# Patient Record
Sex: MALE | Race: BLACK OR AFRICAN AMERICAN | Employment: FULL TIME | URBAN - METROPOLITAN AREA
[De-identification: names, ages, dates, MRNs, and addresses within clinical notes are randomized per-mention and may not be internally consistent; named-entity substitution may affect disease eponyms.]

---

## 2017-04-12 RX ORDER — LIRAGLUTIDE 6 MG/ML
INJECTION SUBCUTANEOUS
Qty: 90 EACH | Refills: 10 | Status: SHIPPED | OUTPATIENT
Start: 2017-04-12 | End: 2018-03-18 | Stop reason: SDUPTHER

## 2017-06-02 ENCOUNTER — OFFICE VISIT (OUTPATIENT)
Dept: INTERNAL MEDICINE CLINIC | Age: 57
End: 2017-06-02

## 2017-06-02 VITALS
RESPIRATION RATE: 18 BRPM | TEMPERATURE: 97.7 F | HEART RATE: 86 BPM | SYSTOLIC BLOOD PRESSURE: 131 MMHG | WEIGHT: 260.9 LBS | DIASTOLIC BLOOD PRESSURE: 78 MMHG | OXYGEN SATURATION: 97 % | HEIGHT: 71 IN | BODY MASS INDEX: 36.52 KG/M2

## 2017-06-02 DIAGNOSIS — E66.09 NON MORBID OBESITY DUE TO EXCESS CALORIES: ICD-10-CM

## 2017-06-02 DIAGNOSIS — I10 ESSENTIAL HYPERTENSION: ICD-10-CM

## 2017-06-02 DIAGNOSIS — E11.9 TYPE 2 DIABETES MELLITUS WITHOUT COMPLICATION, WITH LONG-TERM CURRENT USE OF INSULIN (HCC): Primary | ICD-10-CM

## 2017-06-02 DIAGNOSIS — Z00.00 PREVENTATIVE HEALTH CARE: ICD-10-CM

## 2017-06-02 DIAGNOSIS — E78.5 DYSLIPIDEMIA: ICD-10-CM

## 2017-06-02 DIAGNOSIS — Z79.4 TYPE 2 DIABETES MELLITUS WITHOUT COMPLICATION, WITH LONG-TERM CURRENT USE OF INSULIN (HCC): Primary | ICD-10-CM

## 2017-06-02 NOTE — PROGRESS NOTES
SPORTS MEDICINE AND PRIMARY CARE  Christen Saleh MD, [address] [address]  Phone:  [phone]  Fax: [phone]       Chief Complaint   Patient presents with    Well Male   . SUBJECTIVE:    Ale Moore is a 59 y.o. male Patient returns today ambulatory, alert and appropriate and has the capacity to give an accurate history. He has a known history of diabetes, dyslipidemia, primary hypertension, and is seen for evaluation. Current Outpatient Prescriptions   Medication Sig Dispense Refill    insulin lispro protamine/insulin lispro (HUMALOG MIX 75-25) 100 unit/mL (75-25) injection by SubCUTAneous route. Patient taking 50 units in the morning and 30 in units at night.  VICTOZA 3-KATERINE 0.6 mg/0.1 mL (18 mg/3 mL) sub-q pen DIAL AND INJECT SUBCUTANEOUSLY 1.8MG DAILY 90 Each 10    simvastatin (ZOCOR) 20 mg tablet TAKE ONE TABLET BY MOUTH DAILY 90 Tab 7    LANTUS 100 unit/mL injection INJECT 74 UNITS UNDER THE SKIN BEFORE SUPPER (Patient taking differently: INJECT 70 UNITS UNDER THE SKIN BEFORE SUPPER) 60 Vial 11    valsartan-hydrochlorothiazide (DIOVAN-HCT) 320-25 mg per tablet TAKE ONE TABLET BY MOUTH DAILY 30 Tab 11    metFORMIN ER (GLUCOPHAGE XR) 500 mg tablet TAKE ONE TABLET BY MOUTH TWICE A DAY 60 Tab 11    glucose blood VI test strips (ONETOUCH ULTRA TEST) strip USE TO TEST BLOOD SUGAR FOUR TIMES A  Strip 11    Insulin Needles, Disposable, (ESTEPHANIA PEN NEEDLE) 32 gauge x 5/32\" ndle USE TO INJECT INSULIN EVERY NIGHT 100 Pen Needle 11    Insulin Syringe-Needle U-100 (BD INSULIN SYRINGE ULTRA-FINE) 1 mL 31 x 5/16\" syrg TID ac 100 Syringe 11    mesalamine EC (ASACOL) 400 mg EC tablet Take  by mouth three (3) times daily.        Past Medical History:   Diagnosis Date    [facility]     Dyslipidemia     Hypertension     Obesity     Preventative health care 06/02/2017    S/P colonoscopy 2016    Ulcerative colitis ([facility] 1-15-15    md melodie + Past Surgical History:   Procedure Laterality Date    HX COLONOSCOPY       No Known Allergies      REVIEW OF SYSTEMS:  General: negative for - chills or fever  ENT: negative for - headaches, nasal congestion or tinnitus  Respiratory: negative for - cough, hemoptysis, shortness of breath or wheezing  Cardiovascular : negative for - chest pain, edema, palpitations or shortness of breath  Gastrointestinal: negative for - abdominal pain, blood in stools, heartburn or nausea/vomiting  Genito-Urinary: no dysuria, trouble voiding, or hematuria  Musculoskeletal: negative for - gait disturbance, joint pain, joint stiffness or joint swelling  Neurological: no TIA or stroke symptoms  Hematologic: no bruises, no bleeding, no swollen glands  Integument: no lumps, mole changes, nail changes or rash  Endocrine: no malaise/lethargy or unexpected weight changes      Social History     Social History    Marital status:      Spouse name: N/A    Number of children: N/A    Years of education: N/A     Social History Main Topics    Smoking status: Never Smoker    Smokeless tobacco: Never Used    Alcohol use 0.6 oz/week     1 Glasses of wine per week    Drug use: None    Sexual activity: Not Asked     Other Topics Concern    None     Social History Narrative    Medical History: Diabetes mellitus type 2Primary hypertensionDyslipidemiaObesityher a 17  chest CT negative for tumor    Surgical History: colonoscopy and polypectomy 2009    Hospitalization/Major Diagnostic Procedure: No Hospitalization History. Family History: Mother:  54 yrs transitional cell carcinoma hypertension diabetes Father:  68 yrs Stage renal disease    hypertension diabetes CLL TIA Sister(s): alive Brother(s): alive Diabetes, hypertension polyps Daughter(s): alive 25 yrs w /m fall Son(s): alive 24    yrs Senior at [employer] in economics - hedgefund in [place]  2 brother(s) , 1 sister(s) .  1 son(s) , 1 daughter(s) . education [employer]    Social History: Alcohol Use Patient uses alcohol, Drinks per occasion: 1, Drinks per w [employer] [place]: 1. Smoking Status Patient is a current some day    smoker, Received cessation counseling on: 07/03/2013. Marital Status: . Lives w ith: spouse. 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     Family History   Problem Relation Age of Onset    Cancer Mother        OBJECTIVE:    Visit Vitals    /78 (BP 1 Location: Left arm, BP Patient Position: Sitting)    Pulse 86    Temp 97.7 °F (36.5 °C) (Oral)    Resp 18    Ht 5' 11\" (1.803 m)    Wt 260 lb 14.4 oz (118.3 kg)    SpO2 97%    BMI 36.39 kg/m2     CONSTITUTIONAL: well , well nourished, appears age appropriate  EYES: perrla, eom intact  ENMT:moist mucous membranes, pharynx clear  NECK: supple. Thyroid normal  RESPIRATORY: Chest: clear bilaterally   CARDIOVASCULAR: Heart: regular rate and rhythm  GASTROINTESTINAL: Abdomen: soft, bowel sounds active  HEMATOLOGIC: no pathological lymph nodes palpated  MUSCULOSKELETAL: Extremities: no edema, pulse 1+   INTEGUMENT: No unusual rashes or suspicious skin lesions noted. Nails appear normal.  NEUROLOGIC: non-focal exam   MENTAL STATUS: alert and oriented, appropriate affect           ASSESSMENT:  1. Type 2 diabetes mellitus without complication, with long-term current use of insulin ([address]    2. Dyslipidemia    3. Preventative health care    4. Essential hypertension    5. Non morbid obesity due to excess calories      Patient's medical status is generally stable. We discuss his physical activity for which he is doing on a regular basis as well as a weight reduction program.      Blood pressure control is adequate. We will assess his metabolic status as well as status of his blood sugar with a hemoglobin A1C. EKG will be checked. He will return to the office in one year.   He will have a hemoglobin A1C every six months if it is less than 7, every four months if it is less than 7.5 and every three months if it is greater than 8. PLAN:  .  Orders Placed This Encounter    URINALYSIS W/ RFLX MICROSCOPIC    CBC WITH AUTOMATED DIFF    METABOLIC PANEL, COMPREHENSIVE    LIPID PANEL    PROSTATE SPECIFIC AG    HEMOGLOBIN A1C WITH EAG    MICROALBUMIN, UR, RAND W/ MICROALBUMIN/CREA RATIO    AMB POC EKG ROUTINE W/ 12 LEADS, INTER & REP    insulin lispro protamine/insulin lispro (HUMALOG MIX 75-25) 100 unit/mL (75-25) injection       Follow-up Disposition:  Return in about 1 year (around 6/2/2018). ATTENTION:   This medical record was transcribed using an electronic medical records system. Although proofread, it may and can contain electronic and spelling errors. Other human spelling and other errors may be present. Corrections may be executed at a later time. Please feel free to contact us for any clarifications as needed.

## 2017-06-02 NOTE — MR AVS SNAPSHOT
Visit Information Date & Time Provider Department Dept. Phone Encounter #  
 6/2/2017  9:00 AM Edie Cowan [facility] Sports Medicine and Primary Care [phone] [id number] Follow-up Instructions Return in about 1 year (around 6/2/2018). Follow-up and Disposition History Upcoming Health Maintenance Date Due FOBT Q 1 YEAR AGE 50-75 7/1/2016 INFLUENZA AGE 9 TO ADULT 8/1/2017 DTaP/Tdap/Td series (2 - Td) 6/2/2027 Allergies as of 6/2/2017  Review Complete On: 6/2/2017 By: Edie Cowan MD  
 No Known Allergies Current Immunizations  Never Reviewed No immunizations on file. Not reviewed this visit You Were Diagnosed With   
  
 Codes Comments Type 2 diabetes mellitus without complication, with long-term current use of insulin (HCC)    -  Primary ICD-10-CM: E11.9, Z79.4 ICD-9-CM: 250.00, V58.67 Dyslipidemia     ICD-10-CM: E78.5 ICD-9-CM: 272.4 Preventative health care     ICD-10-CM: Z00.00 ICD-9-CM: V70.0 Essential hypertension     ICD-10-CM: I10 
ICD-9-CM: 401.9 Non morbid obesity due to excess calories     ICD-10-CM: E66.09 
ICD-9-CM: 278.00 Vitals BP Pulse Temp Resp Height(growth percentile) Weight(growth percentile) 131/78 (BP 1 Location: Left arm, BP Patient Position: Sitting) 86 97.7 °F (36.5 °C) (Oral) 18 5' 11\" (1.803 m) 260 lb 14.4 oz (118.3 kg) SpO2 BMI Smoking Status 97% 36.39 kg/m2 Never Smoker BMI and BSA Data Body Mass Index Body Surface Area  
 36.39 kg/m 2 2.43 m 2 Preferred Pharmacy Pharmacy Name Phone [facility] [phone] Your Updated Medication List  
  
   
This list is accurate as of: 6/2/17  9:41 AM.  Always use your most recent med list.  
  
  
  
  
 ASACOL 400 mg EC tablet Generic drug:  mesalamine EC Take  by mouth three (3) times daily. glucose blood VI test strips strip Commonly known as:  ONETOUCH ULTRA TEST  
USE TO TEST BLOOD SUGAR FOUR TIMES A DAY HumaLOG Mix 75-25 100 unit/mL (75-25) injection Generic drug:  insulin lispro protamine/insulin lispro  
by SubCUTAneous route. Patient taking 50 units in the morning and 30 in units at night. Insulin Needles (Disposable) 32 gauge x 5/32\" Ndle Commonly known as:  Sharmin Pen Needle USE TO INJECT INSULIN EVERY NIGHT Insulin Syringe-Needle U-100 1 mL 31 gauge x 5/16 Syrg Commonly known as:  BD INSULIN SYRINGE ULTRA-FINE  
TID ac  
  
 LANTUS 100 unit/mL injection Generic drug:  insulin glargine INJECT 74 UNITS UNDER THE SKIN BEFORE SUPPER  
  
 metFORMIN  mg tablet Commonly known as:  GLUCOPHAGE XR  
TAKE ONE TABLET BY MOUTH TWICE A DAY  
  
 simvastatin 20 mg tablet Commonly known as:  ZOCOR  
TAKE ONE TABLET BY MOUTH DAILY  
  
 valsartan-hydroCHLOROthiazide 320-25 mg per tablet Commonly known as:  DIOVAN-HCT  
TAKE ONE TABLET BY MOUTH DAILY  
  
 VICTOZA 3-KATERINE 0.6 mg/0.1 mL (18 mg/3 mL) sub-q pen Generic drug:  Liraglutide DIAL AND INJECT SUBCUTANEOUSLY 1.8MG DAILY We Performed the Following AMB POC EKG ROUTINE W/ 12 LEADS, INTER & REP [05650 CPT(R)] CBC WITH AUTOMATED DIFF [70815 CPT(R)] HEMOGLOBIN A1C WITH EAG [20978 CPT(R)] LIPID PANEL [40765 CPT(R)] LIPOPROTEIN (A) [[id number] Custom] METABOLIC PANEL, COMPREHENSIVE [01770 CPT(R)] MICROALBUMIN, UR, RAND W/ MICROALBUMIN/CREA RATIO [id number] CPT(R)] ID COLLECTION VENOUS BLOOD,VENIPUNCTURE [id number] CPT(R)] PROSTATE SPECIFIC AG (PSA) [id number] CPT(R)] URINALYSIS W/ RFLX MICROSCOPIC [05749 CPT(R)] Follow-up Instructions Return in about 1 year (around 6/2/2018). Introducing [facility]!    
 Ronnie Gary introduces [employer] patient portal. Now you can access parts of your medical record, email your doctor's office, and request medication refills online. 1. In your internet browser, go to [email or web address] 2. Click on the First Time User? Click Here link in the Sign In box. You will see the New Member Sign Up page. 3. Enter your [email or web address] Access Code exactly as it appears below. You will not need to use this code after youve completed the sign-up process. If you do not sign up before the expiration date, you must request a new code. · [employer] Access Code: [email or web address] Expires: 8/31/2017  9:41 AM 
 
4. Enter the last four digits of your Social Security Number (xxxx) and Date of Birth (mm/dd/yyyy) as indicated and click Submit. You will be taken to the next sign-up page. 5. Create a [employer] ID. This will be your [employer] login ID and cannot be changed, so think of one that is secure and easy to remember. 6. Create a [employer] password. You can change your password at any time. 7. Enter your Password Reset Question and Answer. This can be used at a later time if you forget your password. 8. Enter your e-mail address. You will receive e-mail notification when new information is available in [address] 9. Click Sign Up. You can now view and download portions of your medical record. 10. Click the Download Summary menu link to download a portable copy of your medical information. If you have questions, please visit the Frequently Asked Questions section of the [email or web address] website. Remember, [employer] is NOT to be used for urgent needs. For medical emergencies, dial 911. Now available from your iPhone and Android! Please provide this summary of care documentation to your next provider. If you have any questions after today's visit, please call [phone].

## 2017-06-02 NOTE — PROGRESS NOTES
Chief Complaint   Patient presents with    Well Male   1. Have you been to the ER, urgent care clinic since your last visit? Hospitalized since your last visit? No    2. Have you seen or consulted any other health care providers outside of the [address] [place] [address] since your last visit? Include any pap smears or colon screening.  No

## 2017-07-13 LAB
ALBUMIN SERPL-MCNC: 4.2 G/DL (ref 3.5–5.5)
ALBUMIN/CREAT UR: 31.5 MG/G CREAT (ref 0–30)
ALBUMIN/GLOB SERPL: 1.6 {RATIO} (ref 1.2–2.2)
ALP SERPL-CCNC: 52 IU/L (ref 39–117)
ALT SERPL-CCNC: 22 IU/L (ref 0–44)
APPEARANCE UR: CLEAR
AST SERPL-CCNC: 16 IU/L (ref 0–40)
BASOPHILS # BLD AUTO: 0 X10E3/UL (ref 0–0.2)
BASOPHILS NFR BLD AUTO: 0 %
BILIRUB SERPL-MCNC: 0.4 MG/DL (ref 0–1.2)
BILIRUB UR QL STRIP: NEGATIVE
BUN SERPL-MCNC: 13 MG/DL (ref 6–24)
BUN/CREAT SERPL: 14 (ref 9–20)
CALCIUM SERPL-MCNC: 9.4 MG/DL (ref 8.7–10.2)
CHLORIDE SERPL-SCNC: 100 MMOL/L (ref 96–106)
CHOLEST SERPL-MCNC: 135 MG/DL (ref 100–199)
CO2 SERPL-SCNC: 23 MMOL/L (ref 18–29)
COLOR UR: YELLOW
CREAT SERPL-MCNC: 0.95 MG/DL (ref 0.76–1.27)
CREAT UR-MCNC: 208.6 MG/DL
EOSINOPHIL # BLD AUTO: 0.1 X10E3/UL (ref 0–0.4)
EOSINOPHIL NFR BLD AUTO: 2 %
ERYTHROCYTE [DISTWIDTH] IN BLOOD BY AUTOMATED COUNT: 14.9 % (ref 12.3–15.4)
EST. AVERAGE GLUCOSE BLD GHB EST-MCNC: 212 MG/DL
GLOBULIN SER CALC-MCNC: 2.6 G/DL (ref 1.5–4.5)
GLUCOSE SERPL-MCNC: 141 MG/DL (ref 65–99)
GLUCOSE UR QL: NEGATIVE
HBA1C MFR BLD: 9 % (ref 4.8–5.6)
HCT VFR BLD AUTO: 43 % (ref 37.5–51)
HDLC SERPL-MCNC: 37 MG/DL
HGB BLD-MCNC: 14.6 G/DL (ref 12.6–17.7)
HGB UR QL STRIP: NEGATIVE
IMM GRANULOCYTES # BLD: 0 X10E3/UL (ref 0–0.1)
IMM GRANULOCYTES NFR BLD: 0 %
KETONES UR QL STRIP: NEGATIVE
LDLC SERPL CALC-MCNC: 81 MG/DL (ref 0–99)
LEUKOCYTE ESTERASE UR QL STRIP: NEGATIVE
LPA SERPL-SCNC: 9 NMOL/L
LYMPHOCYTES # BLD AUTO: 2.3 X10E3/UL (ref 0.7–3.1)
LYMPHOCYTES NFR BLD AUTO: 29 %
MCH RBC QN AUTO: 29 PG (ref 26.6–33)
MCHC RBC AUTO-ENTMCNC: 34 G/DL (ref 31.5–35.7)
MCV RBC AUTO: 85 FL (ref 79–97)
MICRO URNS: NORMAL
MICROALBUMIN UR-MCNC: 65.8 UG/ML
MONOCYTES # BLD AUTO: 0.6 X10E3/UL (ref 0.1–0.9)
MONOCYTES NFR BLD AUTO: 7 %
NEUTROPHILS # BLD AUTO: 4.9 X10E3/UL (ref 1.4–7)
NEUTROPHILS NFR BLD AUTO: 62 %
NITRITE UR QL STRIP: NEGATIVE
PH UR STRIP: 6 [PH] (ref 5–7.5)
PLATELET # BLD AUTO: 273 X10E3/UL (ref 150–379)
POTASSIUM SERPL-SCNC: 4.2 MMOL/L (ref 3.5–5.2)
PROT SERPL-MCNC: 6.8 G/DL (ref 6–8.5)
PROT UR QL STRIP: NORMAL
PSA SERPL-MCNC: 0.8 NG/ML (ref 0–4)
RBC # BLD AUTO: 5.04 X10E6/UL (ref 4.14–5.8)
SODIUM SERPL-SCNC: 142 MMOL/L (ref 134–144)
SP GR UR: 1.03 (ref 1–1.03)
TRIGL SERPL-MCNC: 86 MG/DL (ref 0–149)
UROBILINOGEN UR STRIP-MCNC: 0.2 MG/DL (ref 0.2–1)
VLDLC SERPL CALC-MCNC: 17 MG/DL (ref 5–40)
WBC # BLD AUTO: 8 X10E3/UL (ref 3.4–10.8)

## 2017-07-17 RX ORDER — VALSARTAN AND HYDROCHLOROTHIAZIDE 320; 25 MG/1; MG/1
TABLET, FILM COATED ORAL
Qty: 30 TAB | Refills: 10 | Status: SHIPPED | OUTPATIENT
Start: 2017-07-17 | End: 2018-06-11 | Stop reason: SDUPTHER

## 2017-07-17 RX ORDER — METFORMIN HYDROCHLORIDE 500 MG/1
TABLET, EXTENDED RELEASE ORAL
Qty: 60 TAB | Refills: 10 | Status: SHIPPED | OUTPATIENT
Start: 2017-07-17 | End: 2018-06-11 | Stop reason: SDUPTHER

## 2017-08-07 NOTE — TELEPHONE ENCOUNTER
From: Bhargavi Mallory  To: Michael Aaron MD  Sent: 8/6/2017 2:48 PM EDT  Subject: Medication Renewal Request    Original authorizing provider: MD Bhargavi Nunez would like a refill of the following medications:  Insulin Syringe-Needle U-100 (BD INSULIN SYRINGE ULTRA-FINE) 1 mL 31 x 5/16\" syrg Michael Aaron MD]    Preferred pharmacy: [facility]  [address], [place]:

## 2017-10-31 RX ORDER — INSULIN GLARGINE 100 [IU]/ML
INJECTION, SOLUTION SUBCUTANEOUS
Qty: 60 ML | Refills: 11 | Status: SHIPPED | OUTPATIENT
Start: 2017-10-31 | End: 2018-12-16 | Stop reason: SDUPTHER

## 2018-03-19 RX ORDER — LIRAGLUTIDE 6 MG/ML
INJECTION SUBCUTANEOUS
Qty: 9 ADJUSTABLE DOSE PRE-FILLED PEN SYRINGE | Refills: 11 | Status: SHIPPED | OUTPATIENT
Start: 2018-03-19 | End: 2019-03-11 | Stop reason: SDUPTHER

## 2018-05-16 NOTE — TELEPHONE ENCOUNTER
From: Sharonda Linda  To: Vibha Martinez MD  Sent: 5/15/2018 11:18 AM EDT  Subject: Medication Renewal Request    Original authorizing provider: MD Sharonda Guevara would like a refill of the following medications:  glucose blood VI test strips (ONETOUCH ULTRA TEST) strip Vibha Martinez MD]    Preferred pharmacy: [address]    Comment:  Humalog mix 75/25 Rx # [id number] si units s.q.  AM and 50 units s.q. PM

## 2018-05-18 ENCOUNTER — OFFICE VISIT (OUTPATIENT)
Dept: INTERNAL MEDICINE CLINIC | Age: 58
End: 2018-05-18

## 2018-05-18 VITALS
RESPIRATION RATE: 18 BRPM | HEIGHT: 71 IN | WEIGHT: 267.9 LBS | OXYGEN SATURATION: 94 % | BODY MASS INDEX: 37.51 KG/M2 | TEMPERATURE: 97.5 F | HEART RATE: 81 BPM | SYSTOLIC BLOOD PRESSURE: 126 MMHG | DIASTOLIC BLOOD PRESSURE: 74 MMHG

## 2018-05-18 DIAGNOSIS — I10 ESSENTIAL HYPERTENSION: ICD-10-CM

## 2018-05-18 DIAGNOSIS — E78.5 DYSLIPIDEMIA: ICD-10-CM

## 2018-05-18 DIAGNOSIS — E11.21 TYPE 2 DIABETES WITH NEPHROPATHY (HCC): ICD-10-CM

## 2018-05-18 DIAGNOSIS — Z00.00 PREVENTATIVE HEALTH CARE: Primary | ICD-10-CM

## 2018-05-18 PROBLEM — E66.01 SEVERE OBESITY (BMI 35.0-39.9) WITH COMORBIDITY (HCC): Status: ACTIVE | Noted: 2018-05-18

## 2018-05-18 NOTE — PROGRESS NOTES
1. Have you been to the ER, urgent care clinic since your last visit? Hospitalized since your last visit? No    2. Have you seen or consulted any other health care providers outside of the [address] [place] [address] since your last visit? Include any pap smears or colon screening.  No

## 2018-05-18 NOTE — PROGRESS NOTES
SPORTS MEDICINE AND PRIMARY CARE  Favian Wu MD, [address] [address]  Phone:  [phone]  Fax: [phone]       Chief Complaint   Patient presents with    Hypertension     f/u   . SUBJECTIVE:    Colletta Handsome is a 59 y.o. male Patient returns today with known history of diabetes mellitus type 2, obesity, primary hypertension, and dyslipidemia, and is seen for evaluation. He works out three to four times a week. 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 Patient is seen for evaluation. Current Outpatient Prescriptions   Medication Sig Dispense Refill    insulin lispro protamine/insulin lispro (HUMALOG 50-50 MIX) 100 unit/mL (50-50) flexpen 70 Units by SubCUTAneous route Before breakfast and dinner. 42 Adjustable Dose Pre-filled Pen Syringe 11    glucose blood VI test strips (ONETOUCH ULTRA TEST) strip USE TO TEST BLOOD SUGAR FOUR TIMES A  Strip 11    VICTOZA 3-KATERINE 0.6 mg/0.1 mL (18 mg/3 mL) pnij DIAL AND INJECT SUBCUTANEOUSLY 1.8MG DAILY 9 Adjustable Dose Pre-filled Pen Syringe 11    simvastatin (ZOCOR) 20 mg tablet TAKE ONE TABLET BY MOUTH DAILY 90 Tab 6    LANTUS 100 unit/mL injection INJECT 74 UNITS UNDER THE SKIN BEFORE SUPPER 60 mL 11    Insulin Syringe-Needle U-100 1 mL 31 gauge x 5/16 syrg USE WITH LANTUS INSULIN DAILY 100 Syringe 11    valsartan-hydroCHLOROthiazide (DIOVAN-HCT) 320-25 mg per tablet TAKE ONE TABLET BY MOUTH DAILY 30 Tab 10    metFORMIN ER (GLUCOPHAGE XR) 500 mg tablet TAKE ONE TABLET BY MOUTH TWICE A DAY 60 Tab 10    Insulin Needles, Disposable, (ESTEPHANIA PEN NEEDLE) 32 gauge x 5/32\" ndle USE TO INJECT INSULIN EVERY NIGHT 100 Pen Needle 11    mesalamine EC (ASACOL) 400 mg EC tablet Take  by mouth three (3) times daily.        Past Medical History:   Diagnosis Date    Diabetes ([address]     Dyslipidemia     Hypertension     Obesity     Preventative health care 06/02/2017    [facility]     S/P colonoscopy 2016    Ulcerative colitis ([facility] 1-15-15    md melodie +     Past Surgical History:   Procedure Laterality Date    HX COLONOSCOPY       No Known Allergies      REVIEW OF SYSTEMS:  General: negative for - chills or fever  ENT: negative for - headaches, nasal congestion or tinnitus  Respiratory: negative for - cough, hemoptysis, shortness of breath or wheezing  Cardiovascular : negative for - chest pain, edema, palpitations or shortness of breath  Gastrointestinal: negative for - abdominal pain, blood in stools, heartburn or nausea/vomiting  Genito-Urinary: no dysuria, trouble voiding, or hematuria  Musculoskeletal: negative for - gait disturbance, joint pain, joint stiffness or joint swelling  Neurological: no TIA or stroke symptoms  Hematologic: no bruises, no bleeding, no swollen glands  Integument: no lumps, mole changes, nail changes or rash  Endocrine: no malaise/lethargy or unexpected weight changes      Social History     Social History    Marital status:      Spouse name: N/A    Number of children: N/A    Years of education: N/A     Social History Main Topics    Smoking status: Never Smoker    Smokeless tobacco: Never Used    Alcohol use 0.6 oz/week     1 Glasses of wine per week      Comment: occasional    Drug use: No    Sexual activity: Yes     Partners: Female     Birth control/ protection: None     Other Topics Concern    None     Social History Narrative    Medical History: Diabetes mellitus type 2Primary hypertensionDyslipidemiaObesityher a 17  chest CT negative for tumor    Surgical History: colonoscopy and polypectomy 2009    Hospitalization/Major Diagnostic Procedure: No Hospitalization History. Family History:  Mother:  54 yrs transitional cell carcinoma hypertension diabetes Father:  68 yrs Stage renal disease    hypertension diabetes CLL TIA Sister(s): alive Brother(s): alive Diabetes, hypertension polyps Daughter(s): alive 25 yrs w /m fall Son(s): alive 21    yrs Senior at [employer]  2 brother(s) , 1 sister(s) . 1 son(s) , 1 daughter(s) . education [employer] Ronnie u    Social History: Alcohol Use Patient uses alcohol, Drinks per occasion: 1, Drinks per w [place]: 1. Smoking Status Patient is a current some day    smoker, Received cessation counseling on: 07/03/2013. Marital Status: . Lives w ith: spouse. 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     Family History   Problem Relation Age of Onset    Cancer Mother        OBJECTIVE:    Visit Vitals    /74    Pulse 81    Temp 97.5 °F (36.4 °C) (Oral)    Resp 18    Ht 5' 11\" (1.803 m)    Wt 267 lb 14.4 oz (121.5 kg)    SpO2 94%    BMI 37.36 kg/m2     CONSTITUTIONAL: well , well nourished, appears age appropriate  EYES: perrla, eom intact  ENMT:moist mucous membranes, pharynx clear  NECK: supple. Thyroid normal  RESPIRATORY: Chest: clear bilaterally   CARDIOVASCULAR: Heart: regular rate and rhythm  GASTROINTESTINAL: Abdomen: soft, bowel sounds active  HEMATOLOGIC: no pathological lymph nodes palpated  MUSCULOSKELETAL: Extremities: no edema, pulse 1+   INTEGUMENT: No unusual rashes or suspicious skin lesions noted. Nails appear normal.  NEUROLOGIC: non-focal exam   MENTAL STATUS: alert and oriented, appropriate affect           ASSESSMENT:  1. Preventative health care    2. Type 2 diabetes with nephropathy ([address]    3. Essential hypertension    4. Dyslipidemia      This completes his health care visit. We encouraged physical activity and a heart healthy, weight reducing diet. Will assess blood sugar control with Hgb A1c and also look at his urine for microalbumin.     BP control is at goal.    He'll return to the office in 3-4 months or at least yearly for his physical.        PLAN:  .  Orders Placed This Encounter    URINALYSIS W/ RFLX MICROSCOPIC    CBC WITH AUTOMATED DIFF    METABOLIC PANEL, COMPREHENSIVE    LIPID PANEL    PROSTATE SPECIFIC AG    HEMOGLOBIN A1C WITH EAG    CRP, HIGH SENSITIVITY    MICROALBUMIN, UR, RAND W/ MICROALB/CREAT RATIO    AMB POC EKG ROUTINE W/ 12 LEADS, INTER & REP    insulin lispro protamine/insulin lispro (HUMALOG 50-50 MIX) 100 unit/mL (50-50) flexpen       Follow-up Disposition:  Return in about 4 months (around 9/18/2018). ATTENTION:   This medical record was transcribed using an electronic medical records system. Although proofread, it may and can contain electronic and spelling errors. Other human spelling and other errors may be present. Corrections may be executed at a later time. Please feel free to contact us for any clarifications as needed.

## 2018-05-18 NOTE — MR AVS SNAPSHOT
[address] [place] [address] [facility] [id number] [phone] Patient: Drew Anthony MRN: [id number] GTH:[birth year] Visit Information Date & Time Provider Department Dept. Phone Encounter #  
 5/18/2018  8:30 AM Talat Lomeli [facility] [address] [address] [id number] Follow-up Instructions Return in about 4 months (around 9/18/2018). Follow-up and Disposition History Upcoming Health Maintenance Date Due  
 FOOT EXAM Q1 5/9/1968 EYE EXAM RETINAL OR DILATED Q1 5/9/1968 COLONOSCOPY 5/9/1976 Pneumococcal 19-64 Medium Risk (1 of 1 - PPSV23) 5/9/1977 HEMOGLOBIN A1C Q6M 12/2/2017 ZOSTER VACCINE AGE 60> 3/9/2018 MICROALBUMIN Q1 6/2/2018 LIPID PANEL Q1 6/2/2018 Influenza Age 5 to Adult 8/1/2018 DTaP/Tdap/Td series (2 - Td) 6/2/2027 Allergies as of 5/18/2018  Review Complete On: 5/18/2018 By: Lizbet Campa LPN No Known Allergies Current Immunizations  Never Reviewed No immunizations on file. Not reviewed this visit You Were Diagnosed With   
  
 Codes Comments Preventative health care    -  Primary ICD-10-CM: Z00.00 ICD-9-CM: V70.0 Type 2 diabetes with nephropathy (HCC)     ICD-10-CM: E11.21 
ICD-9-CM: 250.40, 583.81 Essential hypertension     ICD-10-CM: I10 
ICD-9-CM: 401.9 Dyslipidemia     ICD-10-CM: E78.5 ICD-9-CM: 272.4 Vitals BP Pulse Temp Resp Height(growth percentile) Weight(growth percentile) 126/74 81 97.5 °F (36.4 °C) (Oral) 18 5' 11\" (1.803 m) 267 lb 14.4 oz (121.5 kg) SpO2 BMI Smoking Status 94% 37.36 kg/m2 Never Smoker Vitals History BMI and BSA Data Body Mass Index Body Surface Area  
 37.36 kg/m 2 2.47 m 2 Preferred Pharmacy Pharmacy Name Phone [facility] [address] [phone] Your Updated Medication List  
  
   
 This list is accurate as of 18 10:30 AM.  Always use your most recent med list.  
  
  
  
  
 ASACOL 400 mg EC tablet Generic drug:  mesalamine EC Take  by mouth three (3) times daily. glucose blood VI test strips strip Commonly known as:  ONETOUCH ULTRA TEST  
USE TO TEST BLOOD SUGAR FOUR TIMES A DAY  
  
 insulin lispro protamine/insulin lispro 100 unit/mL (50-50) flexpen Commonly known as:  HUMALOG 50-50 MIX  
70 Units by SubCUTAneous route Before breakfast and dinner. Insulin Needles (Disposable) 32 gauge x \" Ndle Commonly known as:  Sharmin Pen Needle USE TO INJECT INSULIN EVERY NIGHT Insulin Syringe-Needle U-100 1 mL 31 gauge x  Syrg  
USE WITH LANTUS INSULIN DAILY  
  
 LANTUS U-100 INSULIN 100 unit/mL injection Generic drug:  insulin glargine INJECT 74 UNITS UNDER THE SKIN BEFORE SUPPER  
  
 metFORMIN  mg tablet Commonly known as:  GLUCOPHAGE XR  
TAKE ONE TABLET BY MOUTH TWICE A DAY  
  
 simvastatin 20 mg tablet Commonly known as:  ZOCOR  
TAKE ONE TABLET BY MOUTH DAILY  
  
 valsartan-hydroCHLOROthiazide 320-25 mg per tablet Commonly known as:  DIOVAN-HCT  
TAKE ONE TABLET BY MOUTH DAILY  
  
 VICTOZA 3-KATERINE 0.6 mg/0.1 mL (18 mg/3 mL) Pnij Generic drug:  Liraglutide DIAL AND INJECT SUBCUTANEOUSLY 1.8MG DAILY Prescriptions Sent to Pharmacy Refills  
 insulin lispro protamine/insulin lispro (HUMALOG 50-50 MIX) 100 unit/mL (50-50) flexpen 11 Si Units by SubCUTAneous route Before breakfast and dinner. Class: Normal  
 Pharmacy: [facility] [address] [place] [address] [address], [place] Kos Ph #: [phone] Route: SubCUTAneous We Performed the Following AMB POC EKG ROUTINE W/ 12 LEADS, INTER & REP [60012 CPT(R)] CBC WITH AUTOMATED DIFF [41756 CPT(R)] COLLECTION VENOUS BLOOD,VENIPUNCTURE [id number] CPT(R)] CRP, HIGH SENSITIVITY [44194 CPT(R)] HEMOGLOBIN A1C WITH EAG [02066 CPT(R)] LIPID PANEL [33362 CPT(R)] METABOLIC PANEL, COMPREHENSIVE [99414 CPT(R)] MICROALBUMIN, UR, RAND W/ MICROALB/CREAT RATIO [id number] CPT(R)] PSA, DIAGNOSTIC (PROSTATE SPECIFIC AG) [id number] CPT(R)] URINALYSIS W/ RFLX MICROSCOPIC [96635 CPT(R)] Follow-up Instructions Return in about 4 months (around 9/18/2018). Introducing [facility]! Dear Cole Kleins: Thank you for requesting a [employer] account. Our records indicate that you already have an active [employer] account. You can access your account anytime at [email or web address]. [employer] Did you know that you can access your hospital and ER discharge instructions at any time in [employer]? You can also review all of your test results from your hospital stay or ER visit. Additional Information If you have questions, please visit the Frequently Asked Questions section of the [employer] website at [email or web address] Remember, [employer] is NOT to be used for urgent needs. For medical emergencies, dial 911. Now available from your iPhone and Android! Please provide this summary of care documentation to your next provider. Your primary care clinician is listed as Tatiana James. If you have any questions after today's visit, please call [phone].

## 2018-05-19 LAB
ALBUMIN SERPL-MCNC: 4.5 G/DL (ref 3.6–4.8)
ALBUMIN/CREAT UR: 52.2 MG/G CREAT (ref 0–30)
ALBUMIN/GLOB SERPL: 1.8 {RATIO} (ref 1.2–2.2)
ALP SERPL-CCNC: 49 IU/L (ref 39–117)
ALT SERPL-CCNC: 29 IU/L (ref 0–44)
APPEARANCE UR: CLEAR
AST SERPL-CCNC: 17 IU/L (ref 0–40)
BASOPHILS # BLD AUTO: 0 X10E3/UL (ref 0–0.2)
BASOPHILS NFR BLD AUTO: 0 %
BILIRUB SERPL-MCNC: 0.4 MG/DL (ref 0–1.2)
BILIRUB UR QL STRIP: NEGATIVE
BUN SERPL-MCNC: 12 MG/DL (ref 8–27)
BUN/CREAT SERPL: 16 (ref 10–24)
CALCIUM SERPL-MCNC: 9.5 MG/DL (ref 8.6–10.2)
CHLORIDE SERPL-SCNC: 102 MMOL/L (ref 96–106)
CHOLEST SERPL-MCNC: 166 MG/DL (ref 100–199)
CO2 SERPL-SCNC: 23 MMOL/L (ref 18–29)
COLOR UR: YELLOW
CREAT SERPL-MCNC: 0.75 MG/DL (ref 0.76–1.27)
CREAT UR-MCNC: 81.1 MG/DL
CRP SERPL HS-MCNC: 2.17 MG/L (ref 0–3)
EOSINOPHIL # BLD AUTO: 0.1 X10E3/UL (ref 0–0.4)
EOSINOPHIL NFR BLD AUTO: 2 %
ERYTHROCYTE [DISTWIDTH] IN BLOOD BY AUTOMATED COUNT: 14.6 % (ref 12.3–15.4)
EST. AVERAGE GLUCOSE BLD GHB EST-MCNC: 217 MG/DL
GFR SERPLBLD CREATININE-BSD FMLA CKD-EPI: 100 ML/MIN/1.73
GFR SERPLBLD CREATININE-BSD FMLA CKD-EPI: 115 ML/MIN/1.73
GLOBULIN SER CALC-MCNC: 2.5 G/DL (ref 1.5–4.5)
GLUCOSE SERPL-MCNC: 81 MG/DL (ref 65–99)
GLUCOSE UR QL: NEGATIVE
HBA1C MFR BLD: 9.2 % (ref 4.8–5.6)
HCT VFR BLD AUTO: 44.8 % (ref 37.5–51)
HDLC SERPL-MCNC: 40 MG/DL
HGB BLD-MCNC: 14.9 G/DL (ref 13–17.7)
HGB UR QL STRIP: NEGATIVE
IMM GRANULOCYTES # BLD: 0 X10E3/UL (ref 0–0.1)
IMM GRANULOCYTES NFR BLD: 0 %
KETONES UR QL STRIP: NEGATIVE
LDLC SERPL CALC-MCNC: 100 MG/DL (ref 0–99)
LEUKOCYTE ESTERASE UR QL STRIP: NEGATIVE
LYMPHOCYTES # BLD AUTO: 2.1 X10E3/UL (ref 0.7–3.1)
LYMPHOCYTES NFR BLD AUTO: 29 %
MCH RBC QN AUTO: 28.9 PG (ref 26.6–33)
MCHC RBC AUTO-ENTMCNC: 33.3 G/DL (ref 31.5–35.7)
MCV RBC AUTO: 87 FL (ref 79–97)
MICRO URNS: NORMAL
MICROALBUMIN UR-MCNC: 42.3 UG/ML
MONOCYTES # BLD AUTO: 0.4 X10E3/UL (ref 0.1–0.9)
MONOCYTES NFR BLD AUTO: 6 %
NEUTROPHILS # BLD AUTO: 4.5 X10E3/UL (ref 1.4–7)
NEUTROPHILS NFR BLD AUTO: 63 %
NITRITE UR QL STRIP: NEGATIVE
PH UR STRIP: 5.5 [PH] (ref 5–7.5)
PLATELET # BLD AUTO: 258 X10E3/UL (ref 150–379)
POTASSIUM SERPL-SCNC: 4 MMOL/L (ref 3.5–5.2)
PROT SERPL-MCNC: 7 G/DL (ref 6–8.5)
PROT UR QL STRIP: NEGATIVE
PSA SERPL-MCNC: 0.8 NG/ML (ref 0–4)
RBC # BLD AUTO: 5.16 X10E6/UL (ref 4.14–5.8)
SODIUM SERPL-SCNC: 138 MMOL/L (ref 134–144)
SP GR UR: 1.02 (ref 1–1.03)
TRIGL SERPL-MCNC: 130 MG/DL (ref 0–149)
UROBILINOGEN UR STRIP-MCNC: 0.2 MG/DL (ref 0.2–1)
VLDLC SERPL CALC-MCNC: 26 MG/DL (ref 5–40)
WBC # BLD AUTO: 7.2 X10E3/UL (ref 3.4–10.8)

## 2018-06-11 RX ORDER — VALSARTAN AND HYDROCHLOROTHIAZIDE 320; 25 MG/1; MG/1
TABLET, FILM COATED ORAL
Qty: 30 TAB | Refills: 9 | Status: SHIPPED | OUTPATIENT
Start: 2018-06-11 | End: 2018-08-28 | Stop reason: SINTOL

## 2018-06-11 RX ORDER — METFORMIN HYDROCHLORIDE 500 MG/1
TABLET, EXTENDED RELEASE ORAL
Qty: 60 TAB | Refills: 9 | Status: SHIPPED | OUTPATIENT
Start: 2018-06-11 | End: 2019-06-10 | Stop reason: SDUPTHER

## 2018-08-28 RX ORDER — OLMESARTAN MEDOXOMIL 40 MG/1
40 TABLET ORAL DAILY
Qty: 30 TAB | Refills: 11 | Status: SHIPPED | OUTPATIENT
Start: 2018-08-28 | End: 2019-04-01 | Stop reason: SDUPTHER

## 2018-08-28 RX ORDER — CALCIUM CARB/VITAMIN D3/VIT K1 500-100-40
TABLET,CHEWABLE ORAL
Qty: 100 SYRINGE | Refills: 11 | Status: SHIPPED | OUTPATIENT
Start: 2018-08-28 | End: 2019-10-16 | Stop reason: SDUPTHER

## 2018-12-16 RX ORDER — INSULIN GLARGINE 100 [IU]/ML
INJECTION, SOLUTION SUBCUTANEOUS
Qty: 60 ML | Refills: 11 | Status: SHIPPED | OUTPATIENT
Start: 2018-12-16 | End: 2020-05-05 | Stop reason: SDUPTHER

## 2019-02-09 RX ORDER — SIMVASTATIN 20 MG/1
TABLET, FILM COATED ORAL
Qty: 90 TAB | Refills: 5 | Status: SHIPPED | OUTPATIENT
Start: 2019-02-09 | End: 2020-03-19 | Stop reason: SDUPTHER

## 2019-03-06 RX ORDER — INSULIN GLARGINE 100 [IU]/ML
INJECTION, SOLUTION SUBCUTANEOUS
Qty: 60 VIAL | Refills: 10 | Status: SHIPPED | OUTPATIENT
Start: 2019-03-06 | End: 2020-05-28

## 2019-03-15 RX ORDER — LIRAGLUTIDE 6 MG/ML
INJECTION SUBCUTANEOUS
Qty: 9 ADJUSTABLE DOSE PRE-FILLED PEN SYRINGE | Refills: 11 | Status: SHIPPED | OUTPATIENT
Start: 2019-03-15 | End: 2020-04-02 | Stop reason: SDUPTHER

## 2019-04-30 ENCOUNTER — OFFICE VISIT (OUTPATIENT)
Dept: INTERNAL MEDICINE CLINIC | Age: 59
End: 2019-04-30

## 2019-04-30 VITALS
HEIGHT: 71 IN | HEART RATE: 77 BPM | OXYGEN SATURATION: 94 % | DIASTOLIC BLOOD PRESSURE: 74 MMHG | RESPIRATION RATE: 18 BRPM | SYSTOLIC BLOOD PRESSURE: 116 MMHG | BODY MASS INDEX: 35.78 KG/M2 | WEIGHT: 255.6 LBS | TEMPERATURE: 97.9 F

## 2019-04-30 DIAGNOSIS — E66.01 SEVERE OBESITY (BMI 35.0-39.9) WITH COMORBIDITY (HCC): ICD-10-CM

## 2019-04-30 DIAGNOSIS — E78.5 DYSLIPIDEMIA: ICD-10-CM

## 2019-04-30 DIAGNOSIS — Z00.00 PREVENTATIVE HEALTH CARE: Primary | ICD-10-CM

## 2019-04-30 DIAGNOSIS — I10 ESSENTIAL HYPERTENSION: ICD-10-CM

## 2019-04-30 DIAGNOSIS — E11.21 TYPE 2 DIABETES WITH NEPHROPATHY (HCC): ICD-10-CM

## 2019-04-30 NOTE — PROGRESS NOTES
1. Have you been to the ER, urgent care clinic since your last visit? Hospitalized since your last visit? No 
 
2. Have you seen or consulted any other health care providers outside of the [address] [place] [address] since your last visit? Include any pap smears or colon screening.  No

## 2019-04-30 NOTE — PROGRESS NOTES
[facility] Sharyn Miller MD, [address] [address] Phone:  [phone]  Fax: [phone] Chief Complaint Patient presents with  Annual Exam  
. SUBJECTIVE: 
  Rizwana Urias is a 59 y.o. male Patient returns today for a known preventive healthcare visit. He has a known history of diabetes, hypertension, obesity and remote history of ulcerative colitis and is seen for evaluation. Denies spontaneous complaints. He has titrated his insulin to approach euglycemic response. He is thinking about getting the shingles shot as well as pneumonia shot. He is exercising on a regular basis. He remains active in his practice. Current Outpatient Medications Medication Sig Dispense Refill  valsartan-hydroCHLOROthiazide (DIOVAN-HCT) 320-25 mg per tablet TAKE ONE TABLET BY MOUTH DAILY 30 Tab 11  VICTOZA 3-KATERINE 0.6 mg/0.1 mL (18 mg/3 mL) pnij DIAL AND INJECT 1.8MG SUBCUTANEOUSLY ONCE DAILY 9 Adjustable Dose Pre-filled Pen Syringe 11  
 LANTUS U-100 INSULIN 100 unit/mL injection INJECT 74 UNITS UNDER THE SKIN DAILY BEFORE SUPPER 60 Vial 10  
 simvastatin (ZOCOR) 20 mg tablet TAKE ONE TABLET BY MOUTH DAILY 90 Tab 5  
 LANTUS U-100 INSULIN 100 unit/mL injection INJECT 74 UNITS UNDER THE SKIN DAILY BEFORE SUPPER 60 mL 11  
 Insulin Syringe-Needle U-100 (BD INSULIN SYRINGE ULTRA-FINE) 1 mL 31 gauge x 5/16 syrg TID ac 100 Syringe 11  
 metFORMIN ER (GLUCOPHAGE XR) 500 mg tablet TAKE ONE TABLET BY MOUTH TWICE A DAY 60 Tab 9  
 glucose blood VI test strips (ONETOUCH ULTRA TEST) strip USE TO TEST BLOOD SUGAR FOUR TIMES A  Strip 11  Insulin Syringe-Needle U-100 1 mL 31 gauge x 5/16 syrg USE WITH LANTUS INSULIN DAILY 100 Syringe 11  Insulin Needles, Disposable, (ESTEPHANIA PEN NEEDLE) 32 gauge x 5/32\" ndle USE TO INJECT INSULIN EVERY NIGHT 100 Pen Needle 11  
 insulin lispro protamine/insulin lispro (HUMALOG 50-50 MIX) 100 unit/mL (50-50) flexpen 70 Units by SubCUTAneous route Before breakfast and dinner. 42 Adjustable Dose Pre-filled Pen Syringe 11  
 mesalamine EC (ASACOL) 400 mg EC tablet Take  by mouth three (3) times daily. Past Medical History:  
Diagnosis Date  Diabetes ([facility]  Dyslipidemia  Hypertension  Obesity  Preventative health care 06/02/2017  [facility] health care  S/P colonoscopy 2016  Ulcerative colitis ([facility] 1-15-15  
 md melodie + Past Surgical History:  
Procedure Laterality Date  HX COLONOSCOPY No Known Allergies REVIEW OF SYSTEMS: 
General: negative for - chills or fever ENT: negative for - headaches, nasal congestion or tinnitus Respiratory: negative for - cough, hemoptysis, shortness of breath or wheezing Cardiovascular : negative for - chest pain, edema, palpitations or shortness of breath Gastrointestinal: negative for - abdominal pain, blood in stools, heartburn or nausea/vomiting Genito-Urinary: no dysuria, trouble voiding, or hematuria Musculoskeletal: negative for - gait disturbance, joint pain, joint stiffness or joint swelling Neurological: no TIA or stroke symptoms Hematologic: no bruises, no bleeding, no swollen glands Integument: no lumps, mole changes, nail changes or rash Endocrine: no malaise/lethargy or unexpected weight changes Social History Socioeconomic History  Marital status:  Spouse name: Not on file  Number of children: Not on file  Years of education: Not on file  Highest education level: Not on file Tobacco Use  Smoking status: Never Smoker  Smokeless tobacco: Never Used Substance and Sexual Activity  Alcohol use: Yes Alcohol/week: 0.6 oz Types: 1 Glasses of wine per week Comment: occasional  
 Drug use: No  
 Sexual activity: Yes  
  Partners: Female Birth control/protection: None Social History Narrative  Medical History: Diabetes mellitus type 2Primary hypertensionDyslipidemiaObesityher a 17  chest CT negative for tumor Surgical History: colonoscopy and polypectomy 2009 Hospitalization/Major Diagnostic Procedure: No Hospitalization History. Family History: Mother:  54 yrs transitional cell carcinoma hypertension diabetes Father:  68 yrs Stage renal disease  
 hypertension diabetes CLL TIA Sister(s): alive Brother(s): alive Diabetes, hypertension polyps Daughter(s): alive 25 yrs w /m fall Son(s): alive 24 Yrs investment banking - [employer] in [place]  2 brother(s) , 1 sister(s) . 1 son(s) , 1 daughter(s) . education [facility] [employer] Social History: Alcohol Use Patient uses alcohol, Drinks per occasion: 1, Drinks per w [place]: 1. Smoking Status Patient is a current some day  
 smoker, Received cessation counseling on: 2013. Marital Status: . Lives w ith: spouse. Occupation/W ork: employed full time  
 nephologist. Education/School: has highschool diploma, has college diploma md.  
 
Family History Problem Relation Age of Onset  Cancer Mother OBJECTIVE: 
 
Visit Vitals /74 Pulse 77 Temp 97.9 °F (36.6 °C) (Oral) Resp 18 Ht 5' 11\" (1.803 m) Wt 255 lb 9.6 oz (115.9 kg) SpO2 94% BMI 35.65 kg/m² CONSTITUTIONAL: well , well nourished, appears age appropriate EYES: perrla, eom intact ENMT:moist mucous membranes, pharynx clear NECK: supple. Thyroid normal 
RESPIRATORY: Chest: clear bilaterally CARDIOVASCULAR: Heart: regular rate and rhythm GASTROINTESTINAL: Abdomen: soft, bowel sounds active HEMATOLOGIC: no pathological lymph nodes palpated MUSCULOSKELETAL: Extremities: no edema, pulse 1+ INTEGUMENT: No unusual rashes or suspicious skin lesions noted. Nails appear normal. 
NEUROLOGIC: non-focal exam  
MENTAL STATUS: alert and oriented, appropriate affect ASSESSMENT: 
1. Preventative health care 2. Type 2 diabetes with nephropathy (HCC) 3. Severe obesity (BMI 35.0-39. 9) with comorbidity ([address] 4. Essential hypertension 5. Dyslipidemia Patient's medical status is stable. We congratulate him on his weight loss. BP control is at goal. 
 
I suspect his blood sugar control will be the same. He sees an optometrist for his eyes on a regular basis. He tells me he dilates them as well as checks for glaucoma. He will be back to see us in a year. We communicate with him periodically about his blood sugars. He can stop by either here or locally for a hemoglobin A1c every three months. I have discussed the diagnosis with the patient and the intended plan as seen in the 
orders above. The patient understands and agees with the plan. The patient has  
received an after visit summary and questions were answered concerning 
future plans Patient labs and/or xrays were reviewed Past records were reviewed. PLAN: 
. Orders Placed This Encounter  HEMOGLOBIN A1C WITH EAG  
 URINALYSIS W/ RFLX MICROSCOPIC  CBC WITH AUTOMATED DIFF  
 METABOLIC PANEL, COMPREHENSIVE  LIPID PANEL  
 PROSTATE SPECIFIC AG  
 RUBEOLA AB, IGG  
 REFERRAL FOR COLONOSCOPY Follow-up and Dispositions · Return in about 1 year (around 4/30/2020), or if symptoms worsen or fail to improve. ATTENTION:  
This medical record was transcribed using an electronic medical records system. Although proofread, it may and can contain electronic and spelling errors. Other human spelling and other errors may be present. Corrections may be executed at a later time. Please feel free to contact us for any clarifications as needed.

## 2019-05-01 LAB
ALBUMIN SERPL-MCNC: 4.4 G/DL (ref 3.6–4.8)
ALBUMIN/GLOB SERPL: 1.6 {RATIO} (ref 1.2–2.2)
ALP SERPL-CCNC: 51 IU/L (ref 39–117)
ALT SERPL-CCNC: 18 IU/L (ref 0–44)
APPEARANCE UR: CLEAR
AST SERPL-CCNC: 19 IU/L (ref 0–40)
BASOPHILS # BLD AUTO: 0 X10E3/UL (ref 0–0.2)
BASOPHILS NFR BLD AUTO: 0 %
BILIRUB SERPL-MCNC: 0.3 MG/DL (ref 0–1.2)
BILIRUB UR QL STRIP: NEGATIVE
BUN SERPL-MCNC: 11 MG/DL (ref 8–27)
BUN/CREAT SERPL: 12 (ref 10–24)
CALCIUM SERPL-MCNC: 9.8 MG/DL (ref 8.6–10.2)
CHLORIDE SERPL-SCNC: 101 MMOL/L (ref 96–106)
CHOLEST SERPL-MCNC: 124 MG/DL (ref 100–199)
CO2 SERPL-SCNC: 25 MMOL/L (ref 20–29)
COLOR UR: YELLOW
CREAT SERPL-MCNC: 0.93 MG/DL (ref 0.76–1.27)
EOSINOPHIL # BLD AUTO: 0.2 X10E3/UL (ref 0–0.4)
EOSINOPHIL NFR BLD AUTO: 2 %
ERYTHROCYTE [DISTWIDTH] IN BLOOD BY AUTOMATED COUNT: 14.5 % (ref 12.3–15.4)
EST. AVERAGE GLUCOSE BLD GHB EST-MCNC: 157 MG/DL
GLOBULIN SER CALC-MCNC: 2.7 G/DL (ref 1.5–4.5)
GLUCOSE SERPL-MCNC: 121 MG/DL (ref 65–99)
GLUCOSE UR QL: NEGATIVE
HBA1C MFR BLD: 7.1 % (ref 4.8–5.6)
HCT VFR BLD AUTO: 44.6 % (ref 37.5–51)
HDLC SERPL-MCNC: 38 MG/DL
HGB BLD-MCNC: 14.9 G/DL (ref 13–17.7)
HGB UR QL STRIP: NEGATIVE
IMM GRANULOCYTES # BLD AUTO: 0 X10E3/UL (ref 0–0.1)
IMM GRANULOCYTES NFR BLD AUTO: 0 %
KETONES UR QL STRIP: NEGATIVE
LDLC SERPL CALC-MCNC: 69 MG/DL (ref 0–99)
LEUKOCYTE ESTERASE UR QL STRIP: NEGATIVE
LYMPHOCYTES # BLD AUTO: 2.3 X10E3/UL (ref 0.7–3.1)
LYMPHOCYTES NFR BLD AUTO: 25 %
MCH RBC QN AUTO: 28.9 PG (ref 26.6–33)
MCHC RBC AUTO-ENTMCNC: 33.4 G/DL (ref 31.5–35.7)
MCV RBC AUTO: 86 FL (ref 79–97)
MEV IGG SER IA-ACNC: >300 AU/ML
MICRO URNS: NORMAL
MONOCYTES # BLD AUTO: 0.6 X10E3/UL (ref 0.1–0.9)
MONOCYTES NFR BLD AUTO: 6 %
NEUTROPHILS # BLD AUTO: 6.2 X10E3/UL (ref 1.4–7)
NEUTROPHILS NFR BLD AUTO: 67 %
NITRITE UR QL STRIP: NEGATIVE
PH UR STRIP: 5.5 [PH] (ref 5–7.5)
PLATELET # BLD AUTO: 290 X10E3/UL (ref 150–379)
POTASSIUM SERPL-SCNC: 4.2 MMOL/L (ref 3.5–5.2)
PROT SERPL-MCNC: 7.1 G/DL (ref 6–8.5)
PROT UR QL STRIP: NEGATIVE
PSA SERPL-MCNC: 1.2 NG/ML (ref 0–4)
RBC # BLD AUTO: 5.16 X10E6/UL (ref 4.14–5.8)
SODIUM SERPL-SCNC: 140 MMOL/L (ref 134–144)
SP GR UR: 1.01 (ref 1–1.03)
TRIGL SERPL-MCNC: 86 MG/DL (ref 0–149)
UROBILINOGEN UR STRIP-MCNC: 0.2 MG/DL (ref 0.2–1)
VLDLC SERPL CALC-MCNC: 17 MG/DL (ref 5–40)
WBC # BLD AUTO: 9.3 X10E3/UL (ref 3.4–10.8)

## 2019-05-20 RX ORDER — INSULIN LISPRO 100 [IU]/ML
INJECTION, SUSPENSION SUBCUTANEOUS
Qty: 5 PEN | Refills: 11 | Status: SHIPPED | OUTPATIENT
Start: 2019-05-20 | End: 2020-03-19 | Stop reason: CLARIF

## 2019-10-16 RX ORDER — CALCIUM CARB/VITAMIN D3/VIT K1 500-100-40
TABLET,CHEWABLE ORAL
Qty: 100 SYRINGE | Refills: 10 | Status: SHIPPED | OUTPATIENT
Start: 2019-10-16 | End: 2020-05-05 | Stop reason: SDUPTHER

## 2020-03-19 RX ORDER — SYRINGE AND NEEDLE,INSULIN,1ML 31GX15/64"
1 SYRINGE, EMPTY DISPOSABLE MISCELLANEOUS 2 TIMES DAILY
Qty: 180 PEN NEEDLE | Refills: 11 | Status: SHIPPED | OUTPATIENT
Start: 2020-03-19 | End: 2021-07-25

## 2020-03-19 RX ORDER — SIMVASTATIN 20 MG/1
TABLET, FILM COATED ORAL
Qty: 90 TAB | Refills: 5 | Status: SHIPPED | OUTPATIENT
Start: 2020-03-19 | End: 2021-03-30

## 2020-04-02 RX ORDER — LIRAGLUTIDE 6 MG/ML
INJECTION SUBCUTANEOUS
Qty: 9 ADJUSTABLE DOSE PRE-FILLED PEN SYRINGE | Refills: 11 | Status: SHIPPED | OUTPATIENT
Start: 2020-04-02 | End: 2021-03-26

## 2020-05-05 ENCOUNTER — VIRTUAL VISIT (OUTPATIENT)
Dept: INTERNAL MEDICINE CLINIC | Age: 60
End: 2020-05-05

## 2020-05-05 DIAGNOSIS — K51.90 ULCERATIVE COLITIS WITHOUT COMPLICATIONS, UNSPECIFIED LOCATION (HCC): ICD-10-CM

## 2020-05-05 DIAGNOSIS — E66.01 SEVERE OBESITY (BMI 35.0-39.9) WITH COMORBIDITY (HCC): ICD-10-CM

## 2020-05-05 DIAGNOSIS — E78.5 DYSLIPIDEMIA: ICD-10-CM

## 2020-05-05 DIAGNOSIS — E11.21 TYPE 2 DIABETES WITH NEPHROPATHY (HCC): ICD-10-CM

## 2020-05-05 DIAGNOSIS — I10 ESSENTIAL HYPERTENSION: Primary | ICD-10-CM

## 2020-05-05 RX ORDER — INSULIN LISPRO 100 [IU]/ML
45 INJECTION, SUSPENSION SUBCUTANEOUS 2 TIMES DAILY
Qty: 27 ADJUSTABLE DOSE PRE-FILLED PEN SYRINGE | Refills: 11 | Status: SHIPPED | OUTPATIENT
Start: 2020-05-05 | End: 2021-06-06

## 2020-05-05 NOTE — ASSESSMENT & PLAN NOTE
This condition is managed by Specialist.  Lab Results   Component Value Date/Time    WBC 9.3 04/30/2019 10:08 AM    HGB 14.9 04/30/2019 10:08 AM    HCT 44.6 04/30/2019 10:08 AM    PLATELET 761 83/14/5514 10:08 AM    Creatinine 0.93 04/30/2019 10:08 AM    BUN 11 04/30/2019 10:08 AM    Potassium 4.2 04/30/2019 10:08 AM

## 2020-05-05 NOTE — ASSESSMENT & PLAN NOTE
Key Antihyperglycemic Medications             insulin lispro protamin-lispro (HumaLOG Mix 75-25 KwikPen) flexpen (Taking) 70 Units by SubCUTAneous route two (2) times a day. insulin lispro protamine/insulin lispro (HUMALOG MIX 75/25) 100 unit/mL (75-25) injection (Taking) 70 Units by SubCUTAneous route Before breakfast and dinner.     liraglutide (Victoza 3-Anjel) 0.6 mg/0.1 mL (18 mg/3 mL) pnij (Taking) DIAL AND INJECT 1.8MG SUBCUTANEOUSLY ONCE DAILY    metFORMIN ER (GLUCOPHAGE XR) 500 mg tablet (Taking) TAKE ONE TABLET BY MOUTH TWICE A DAY    LANTUS U-100 INSULIN 100 unit/mL injection (Taking) INJECT 74 UNITS UNDER THE SKIN DAILY BEFORE SUPPER    LANTUS U-100 INSULIN 100 unit/mL injection (Taking) INJECT 74 UNITS UNDER THE SKIN DAILY BEFORE SUPPER        Other Key Diabetic Medications             valsartan-hydroCHLOROthiazide (DIOVAN-HCT) 320-25 mg per tablet (Taking) TAKE ONE TABLET BY MOUTH DAILY    simvastatin (ZOCOR) 20 mg tablet (Taking) TAKE ONE TABLET BY MOUTH DAILY        Lab Results   Component Value Date/Time    Hemoglobin A1c 7.1 04/30/2019 10:08 AM    Glucose 121 04/30/2019 10:08 AM    Creatinine 0.93 04/30/2019 10:08 AM    Cholesterol, total 124 04/30/2019 10:08 AM    HDL Cholesterol 38 04/30/2019 10:08 AM    LDL, calculated 69 04/30/2019 10:08 AM    Triglyceride 86 04/30/2019 10:08 AM     Diabetic Foot and Eye Exam HM Status   Topic Date Due    Eye Exam  05/09/1968    Diabetic Foot Care  04/30/2020

## 2020-05-05 NOTE — ASSESSMENT & PLAN NOTE
Stable, based on history, physical exam and review of pertinent labs, studies and medications; meds reconciled; continue current treatment plan.   Key Obesity Meds             liraglutide (Victoza 3-Anjel) 0.6 mg/0.1 mL (18 mg/3 mL) pnij (Taking) DIAL AND INJECT 1.8MG SUBCUTANEOUSLY ONCE DAILY    metFORMIN ER (GLUCOPHAGE XR) 500 mg tablet (Taking) TAKE ONE TABLET BY MOUTH TWICE A DAY        Lab Results   Component Value Date/Time    Hemoglobin A1c 7.1 04/30/2019 10:08 AM    Glucose 121 04/30/2019 10:08 AM    Cholesterol, total 124 04/30/2019 10:08 AM    HDL Cholesterol 38 04/30/2019 10:08 AM    LDL, calculated 69 04/30/2019 10:08 AM    Triglyceride 86 04/30/2019 10:08 AM    Sodium 140 04/30/2019 10:08 AM    Potassium 4.2 04/30/2019 10:08 AM    ALT (SGPT) 18 04/30/2019 10:08 AM    AST (SGOT) 19 04/30/2019 10:08 AM

## 2020-05-05 NOTE — PROGRESS NOTES
1. Have you been to the ER, urgent care clinic since your last visit? Hospitalized since your last visit? No    2. Have you seen or consulted any other health care providers outside of the [address] [place] [address] since your last visit? Include any pap smears or colon screening.  No     Wants to s to discuss prior auth

## 2020-05-06 NOTE — PROGRESS NOTES
Neil Coronel is a 62 y.o. male who was seen by synchronous (real-time) audio-video technology on 5/5/2020. Consent: Neil Coronel, who was seen by synchronous (real-time) audio-video technology, and/or his healthcare decision maker, is aware that this patient-initiated, Telehealth encounter on 5/5/2020 is a billable service, with coverage as determined by his insurance carrier. He is aware that he may receive a bill and has provided verbal consent to proceed: Yes. Assessment & Plan:   Diagnoses and all orders for this visit:    1. Essential hypertension he monitors his blood pressure at home on a regular basis. Blood pressure reading today was 138/88. No change in his current medications. 2. Type 2 diabetes with nephropathy ([address] he monitors his blood sugars regularly and titrate his insulin accordingly. Fasting blood sugar was 150 today. Assessment & Plan:    Key Antihyperglycemic Medications             insulin lispro protamin-lispro (HumaLOG Mix 75-25 KwikPen) flexpen (Taking) 70 Units by SubCUTAneous route two (2) times a day. insulin lispro protamine/insulin lispro (HUMALOG MIX 75/25) 100 unit/mL (75-25) injection (Taking) 70 Units by SubCUTAneous route Before breakfast and dinner.     liraglutide (Victoza 3-Anjel) 0.6 mg/0.1 mL (18 mg/3 mL) pnij (Taking) DIAL AND INJECT 1.8MG SUBCUTANEOUSLY ONCE DAILY    metFORMIN ER (GLUCOPHAGE XR) 500 mg tablet (Taking) TAKE ONE TABLET BY MOUTH TWICE A DAY    LANTUS U-100 INSULIN 100 unit/mL injection (Taking) INJECT 74 UNITS UNDER THE SKIN DAILY BEFORE SUPPER    LANTUS U-100 INSULIN 100 unit/mL injection (Taking) INJECT 74 UNITS UNDER THE SKIN DAILY BEFORE SUPPER        Other Key Diabetic Medications             valsartan-hydroCHLOROthiazide (DIOVAN-HCT) 320-25 mg per tablet (Taking) TAKE ONE TABLET BY MOUTH DAILY    simvastatin (ZOCOR) 20 mg tablet (Taking) TAKE ONE TABLET BY MOUTH DAILY        Lab Results   Component Value Date/Time    Hemoglobin A1c 7.1 04/30/2019 10:08 AM    Glucose 121 04/30/2019 10:08 AM    Creatinine 0.93 04/30/2019 10:08 AM    Cholesterol, total 124 04/30/2019 10:08 AM    HDL Cholesterol 38 04/30/2019 10:08 AM    LDL, calculated 69 04/30/2019 10:08 AM    Triglyceride 86 04/30/2019 10:08 AM     Diabetic Foot and Eye Exam HM Status   Topic Date Due    Eye Exam  05/09/1970    Diabetic Foot Care  04/30/2020       Orders:  -     REFERRAL TO OPHTHALMOLOGY    3. Severe obesity (BMI 35.0-39. 9) with comorbidity ([address] this continue to remain a challenge for him. He continues to actively attempt to adhere to a heart healthy diabetic weight reducing diet and exercise program.  Assessment & Plan:  Stable, based on history, physical exam and review of pertinent labs, studies and medications; meds reconciled; continue current treatment plan. Key Obesity Meds             liraglutide (Victoza 3-Anjel) 0.6 mg/0.1 mL (18 mg/3 mL) pnij (Taking) DIAL AND INJECT 1.8MG SUBCUTANEOUSLY ONCE DAILY    metFORMIN ER (GLUCOPHAGE XR) 500 mg tablet (Taking) TAKE ONE TABLET BY MOUTH TWICE A DAY        Lab Results   Component Value Date/Time    Hemoglobin A1c 7.1 04/30/2019 10:08 AM    Glucose 121 04/30/2019 10:08 AM    Cholesterol, total 124 04/30/2019 10:08 AM    HDL Cholesterol 38 04/30/2019 10:08 AM    LDL, calculated 69 04/30/2019 10:08 AM    Triglyceride 86 04/30/2019 10:08 AM    Sodium 140 04/30/2019 10:08 AM    Potassium 4.2 04/30/2019 10:08 AM    ALT (SGPT) 18 04/30/2019 10:08 AM    AST (SGOT) 19 04/30/2019 10:08 AM             4. Ulcerative colitis without complications, unspecified location [facility]) currently is asymptomatic is followed by gastroneurologist Holger Hodges MD.  Assessment & Plan:   This condition is managed by Specialist.  Lab Results   Component Value Date/Time    WBC 9.3 04/30/2019 10:08 AM    HGB 14.9 04/30/2019 10:08 AM    HCT 44.6 04/30/2019 10:08 AM    PLATELET 080 47/05/8848 10:08 AM    Creatinine 0.93 04/30/2019 10:08 AM    BUN 11 04/30/2019 10:08 AM    Potassium 4.2 04/30/2019 10:08 AM         5. Dyslipidemia we will assess his lipid profile and metabolic status today with appropriate orders. Other orders  -     insulin lispro protamin-lispro (HumaLOG Mix 75-25 KwikPen) flexpen; 45 Units by SubCUTAneous route two (2) times a day. He will return to see us in 4 to 6 months. Lab requisition will be sent to [address] [place] [address] [facility].. Subjective:   William Lo is a 62 y.o. male who was seen for Hypertension  Patient is seen today with a virtual, video, telehealth encounter. He has a known history of diabetes mellitus type 2, obesity, primary hypertension, dyslipidemia, and is seen for evaluation. He has no new complaints. He is exercising more frequently now with walks since he can go to his gym. Blood sugar control is been fairly good is seen for evaluation. Prior to Admission medications    Medication Sig Start Date End Date Taking? Authorizing Provider   insulin lispro protamin-lispro (HumaLOG Mix 75-25 KwikPen) flexpen 45 Units by SubCUTAneous route two (2) times a day. 5/5/20  Yes Rufus Alston MD   valsartan-hydroCHLOROthiazide (DIOVAN-HCT) 320-25 mg per tablet TAKE ONE TABLET BY MOUTH DAILY 4/26/20  Yes Rufus Alston MD   liraglutide (Victoza 3-Anjel) 0.6 mg/0.1 mL (18 mg/3 mL) pnij DIAL AND INJECT 1.8MG SUBCUTANEOUSLY ONCE DAILY 4/2/20  Yes Rufus Alston MD   simvastatin (ZOCOR) 20 mg tablet TAKE ONE TABLET BY MOUTH DAILY 3/19/20  Yes Rufus Alston MD   insulin syringe-needle U-100 1 mL 31 gauge x 15/64\" syrg 1 Units by Does Not Apply route two (2) times a day.  3/19/20  Yes Rufus Alston MD   ONETOUCH ULTRA BLUE TEST STRIP strip USE TO TEST FOR BLOOD SUGAR FOUR TIMES A DAY 6/16/19  Yes Rufus Alston MD   metFORMIN ER (GLUCOPHAGE XR) 500 mg tablet TAKE ONE TABLET BY MOUTH TWICE A DAY 6/10/19  Yes Rufus Alston MD   LANTUS U-100 INSULIN 100 unit/mL injection INJECT 74 UNITS UNDER THE SKIN DAILY BEFORE SUPPER 3/6/19  Yes Nancy Cardoso MD   Insulin Syringe-Needle U-100 1 mL 31 gauge x 5/16 syrg USE WITH LANTUS INSULIN DAILY 8/15/17  Yes Nancy Cardoso MD   Insulin Needles, Disposable, (ESTEPHANIA PEN NEEDLE) 32 gauge x 5/32\" ndle USE TO INJECT INSULIN EVERY NIGHT 3/18/16  Yes Aj Thomas MD   mesalamine EC (ASACOL) 400 mg EC tablet Take  by mouth three (3) times daily. Provider, Historical     No Known Allergies    Patient Active Problem List   Diagnosis Code    Dyslipidemia E78.5    Ulcerative colitis ([facility] K51.90    Hypertension I10    S/P colonoscopy Z98.890    Preventative health care Z00.00    [facility] E11.21    Severe obesity (BMI 35.0-39. 9) with comorbidity ([facility] E66.01     Patient Active Problem List    Diagnosis Date Noted    Type 2 diabetes with nephropathy ([facility] 05/18/2018    Severe obesity (BMI 35.0-39. 9) with comorbidity ([facility] 05/18/2018    Preventative health care 06/02/2017    Hypertension     S/P colonoscopy 01/01/2016    Dyslipidemia     [facility] colitis ([facility] 01/15/2015     Current Outpatient Medications   Medication Sig Dispense Refill    insulin lispro protamin-lispro (HumaLOG Mix 75-25 KwikPen) flexpen 45 Units by SubCUTAneous route two (2) times a day. 27 Adjustable Dose Pre-filled Pen Syringe 11    valsartan-hydroCHLOROthiazide (DIOVAN-HCT) 320-25 mg per tablet TAKE ONE TABLET BY MOUTH DAILY 90 Tab 10    liraglutide (Victoza 3-Anjel) 0.6 mg/0.1 mL (18 mg/3 mL) pnij DIAL AND INJECT 1.8MG SUBCUTANEOUSLY ONCE DAILY 9 Adjustable Dose Pre-filled Pen Syringe 11    simvastatin (ZOCOR) 20 mg tablet TAKE ONE TABLET BY MOUTH DAILY 90 Tab 5    insulin syringe-needle U-100 1 mL 31 gauge x 15/64\" syrg 1 Units by Does Not Apply route two (2) times a day.  180 Pen Needle 11    ONETOUCH ULTRA BLUE TEST STRIP strip USE TO TEST FOR BLOOD SUGAR FOUR TIMES A  Strip 10    metFORMIN ER (GLUCOPHAGE XR) 500 mg tablet TAKE ONE TABLET BY MOUTH TWICE A  Tab 8    LANTUS U-100 INSULIN 100 unit/mL injection INJECT 74 UNITS UNDER THE SKIN DAILY BEFORE SUPPER 60 Vial 10    Insulin Syringe-Needle U-100 1 mL 31 gauge x 5/16 syrg USE WITH LANTUS INSULIN DAILY 100 Syringe 11    Insulin Needles, Disposable, (ESTEPHANIA PEN NEEDLE) 32 gauge x 5/32\" ndle USE TO INJECT INSULIN EVERY NIGHT 100 Pen Needle 11    mesalamine EC (ASACOL) 400 mg EC tablet Take  by mouth three (3) times daily. No Known Allergies  Past Medical History:   Diagnosis Date    Diabetes ([facility]     Dyslipidemia     Hypertension     Obesity     [facility] 06/02/2017    [facility]     S/P colonoscopy 2016    Ulcerative colitis ([facility] 1-15-15    md melodie +     Past Surgical History:   Procedure Laterality Date    HX COLONOSCOPY       Family History   Problem Relation Age of Onset    Cancer Mother      Social History     Tobacco Use    Smoking status: Never Smoker    Smokeless tobacco: Never Used   Substance Use Topics    Alcohol use: Yes     Alcohol/week: 1.0 standard drinks     Types: 1 Glasses of wine per week     Comment: occasional       ROS    Objective:   Vital Signs: (As obtained by patient/caregiver at home)  There were no vitals taken for this visit.      [INSTRUCTIONS:  \"[x]\" Indicates a positive item  \"[]\" Indicates a negative item  -- DELETE ALL ITEMS NOT EXAMINED]    Constitutional: [x] Appears well-developed and well-nourished [x] No apparent distress      [] Abnormal -     Mental status: [x] Alert and awake  [x] Oriented to person/place/time [x] Able to follow commands    [] Abnormal -     Eyes:   EOM    [x]  Normal    [] Abnormal -   Sclera  [x]  Normal    [] Abnormal -          Discharge [x]  None visible   [] Abnormal -     HENT: [x] Normocephalic, atraumatic  [] Abnormal -   [x] Mouth/Throat: Mucous membranes are moist    External Ears [x] Normal  [] Abnormal -    Neck: [x] No visualized mass [] Abnormal -     Pulmonary/Chest: [x] Respiratory effort normal   [x] No visualized signs of difficulty breathing or respiratory distress        [] Abnormal -      Musculoskeletal:   [x] Normal gait with no signs of ataxia         [x] Normal range of motion of neck        [] Abnormal -     Neurological:        [x] No Facial Asymmetry (Cranial nerve 7 motor function) (limited exam due to video visit)          [x] No gaze palsy        [] Abnormal -          Skin:        [x] No significant exanthematous lesions or discoloration noted on facial skin         [] Abnormal -            Psychiatric:       [x] Normal Affect [] Abnormal -        [x] No Hallucinations    Other pertinent observable physical exam findings:-        We discussed the expected course, resolution and complications of the diagnosis(es) in detail. Medication risks, benefits, costs, interactions, and alternatives were discussed as indicated. I advised him to contact the office if his condition worsens, changes or fails to improve as anticipated. He expressed understanding with the diagnosis(es) and plan. Jovon Hernandez is a 62 y.o. male who was evaluated by a video visit encounter for concerns as above. Patient identification was verified prior to start of the visit. A caregiver was present when appropriate. Due to this being a TeleHealth encounter ([facility]), evaluation of the following organ systems was limited: Vitals/Constitutional/EENT/Resp/CV/GI//MS/Neuro/Skin/Heme-Lymph-Imm. Pursuant to the emergency declaration under the [facility], [facility] waiver authority and the [employer] Act, this Virtual  Visit was conducted, with patient's (and/or legal guardian's) consent, to reduce the patient's risk of exposure to COVID-19 and provide necessary medical care.      Services were provided through a video synchronous discussion virtually to substitute for in-person clinic visit. Patient and provider were located at their individual homes. Valencia Guerra MD    Please note that portions of this dictation may have been recorded with voice recognition software. Some unanticipated grammatical, syntax, homophones, and other interpretive errors are inadvertently transcribed by the computer software. An attempt at proof reading has been made to minimize errors and omissions. Please disregard these errors. Thank you.

## 2020-06-27 RX ORDER — METFORMIN HYDROCHLORIDE 500 MG/1
TABLET, EXTENDED RELEASE ORAL
Qty: 180 TAB | Refills: 7 | Status: SHIPPED | OUTPATIENT
Start: 2020-06-27 | End: 2021-09-22

## 2020-11-30 DIAGNOSIS — E11.21 TYPE 2 DIABETES WITH NEPHROPATHY (HCC): Primary | ICD-10-CM

## 2020-11-30 DIAGNOSIS — E78.5 DYSLIPIDEMIA: ICD-10-CM

## 2020-11-30 DIAGNOSIS — I10 ESSENTIAL HYPERTENSION: ICD-10-CM

## 2020-11-30 DIAGNOSIS — U07.1 COVID-19 VIRUS INFECTION: ICD-10-CM

## 2020-12-03 LAB
ALBUMIN SERPL-MCNC: 3.9 G/DL (ref 3.8–4.8)
ALBUMIN/CREAT UR: 19 MG/G CREAT (ref 0–29)
ALBUMIN/GLOB SERPL: 1.2 {RATIO} (ref 1.2–2.2)
ALP SERPL-CCNC: 57 IU/L (ref 39–117)
ALT SERPL-CCNC: 34 IU/L (ref 0–44)
APPEARANCE UR: CLEAR
AST SERPL-CCNC: 17 IU/L (ref 0–40)
BASOPHILS # BLD AUTO: 0 X10E3/UL (ref 0–0.2)
BASOPHILS NFR BLD AUTO: 0 %
BILIRUB SERPL-MCNC: 0.4 MG/DL (ref 0–1.2)
BILIRUB UR QL STRIP: NEGATIVE
BUN SERPL-MCNC: 18 MG/DL (ref 8–27)
BUN/CREAT SERPL: 20 (ref 10–24)
CALCIUM SERPL-MCNC: 9.4 MG/DL (ref 8.6–10.2)
CHLORIDE SERPL-SCNC: 101 MMOL/L (ref 96–106)
CHOLEST SERPL-MCNC: 136 MG/DL (ref 100–199)
CO2 SERPL-SCNC: 21 MMOL/L (ref 20–29)
COLOR UR: YELLOW
CREAT SERPL-MCNC: 0.91 MG/DL (ref 0.76–1.27)
CREAT UR-MCNC: 90.9 MG/DL
CRP SERPL HS-MCNC: 10.02 MG/L (ref 0–3)
D DIMER PPP FEU-MCNC: 0.74 MG/L FEU (ref 0–0.49)
EOSINOPHIL # BLD AUTO: 0 X10E3/UL (ref 0–0.4)
EOSINOPHIL NFR BLD AUTO: 0 %
ERYTHROCYTE [DISTWIDTH] IN BLOOD BY AUTOMATED COUNT: 13.1 % (ref 11.6–15.4)
ERYTHROCYTE [SEDIMENTATION RATE] IN BLOOD BY WESTERGREN METHOD: 102 MM/HR (ref 0–30)
EST. AVERAGE GLUCOSE BLD GHB EST-MCNC: 186 MG/DL
FERRITIN SERPL-MCNC: 296 NG/ML (ref 30–400)
GLOBULIN SER CALC-MCNC: 3.2 G/DL (ref 1.5–4.5)
GLUCOSE SERPL-MCNC: 147 MG/DL (ref 65–99)
GLUCOSE UR QL: NEGATIVE
HBA1C MFR BLD: 8.1 % (ref 4.8–5.6)
HCT VFR BLD AUTO: 42.7 % (ref 37.5–51)
HDLC SERPL-MCNC: 38 MG/DL
HGB BLD-MCNC: 14.4 G/DL (ref 13–17.7)
HGB UR QL STRIP: NEGATIVE
IL6 SERPL-MCNC: <2.5 PG/ML (ref 0–13)
IMM GRANULOCYTES # BLD AUTO: 0.3 X10E3/UL (ref 0–0.1)
IMM GRANULOCYTES NFR BLD AUTO: 2 %
KETONES UR QL STRIP: NEGATIVE
LDLC SERPL CALC-MCNC: 74 MG/DL (ref 0–99)
LEUKOCYTE ESTERASE UR QL STRIP: NEGATIVE
LYMPHOCYTES # BLD AUTO: 1.5 X10E3/UL (ref 0.7–3.1)
LYMPHOCYTES NFR BLD AUTO: 10 %
MCH RBC QN AUTO: 28.7 PG (ref 26.6–33)
MCHC RBC AUTO-ENTMCNC: 33.7 G/DL (ref 31.5–35.7)
MCV RBC AUTO: 85 FL (ref 79–97)
MICRO URNS: NORMAL
MICROALBUMIN UR-MCNC: 17.4 UG/ML
MONOCYTES # BLD AUTO: 0.6 X10E3/UL (ref 0.1–0.9)
MONOCYTES NFR BLD AUTO: 4 %
NEUTROPHILS # BLD AUTO: 12.8 X10E3/UL (ref 1.4–7)
NEUTROPHILS NFR BLD AUTO: 84 %
NITRITE UR QL STRIP: NEGATIVE
PH UR STRIP: 6.5 [PH] (ref 5–7.5)
PLATELET # BLD AUTO: 508 X10E3/UL (ref 150–450)
POTASSIUM SERPL-SCNC: 5.1 MMOL/L (ref 3.5–5.2)
PROT SERPL-MCNC: 7.1 G/DL (ref 6–8.5)
PROT UR QL STRIP: NEGATIVE
PSA SERPL-MCNC: 1.2 NG/ML (ref 0–4)
RBC # BLD AUTO: 5.01 X10E6/UL (ref 4.14–5.8)
SODIUM SERPL-SCNC: 138 MMOL/L (ref 134–144)
SP GR UR: 1.02 (ref 1–1.03)
TRIGL SERPL-MCNC: 133 MG/DL (ref 0–149)
UROBILINOGEN UR STRIP-MCNC: 1 MG/DL (ref 0.2–1)
VLDLC SERPL CALC-MCNC: 24 MG/DL (ref 5–40)
WBC # BLD AUTO: 15.3 X10E3/UL (ref 3.4–10.8)

## 2021-03-26 RX ORDER — LIRAGLUTIDE 6 MG/ML
INJECTION SUBCUTANEOUS
Qty: 9 ADJUSTABLE DOSE PRE-FILLED PEN SYRINGE | Refills: 11 | Status: SHIPPED | OUTPATIENT
Start: 2021-03-26 | End: 2022-03-21

## 2021-03-30 RX ORDER — SIMVASTATIN 20 MG/1
TABLET, FILM COATED ORAL
Qty: 90 TAB | Refills: 4 | Status: SHIPPED | OUTPATIENT
Start: 2021-03-30 | End: 2022-06-23

## 2021-05-06 ENCOUNTER — OFFICE VISIT (OUTPATIENT)
Dept: INTERNAL MEDICINE CLINIC | Age: 61
End: 2021-05-06
Payer: COMMERCIAL

## 2021-05-06 VITALS
RESPIRATION RATE: 20 BRPM | HEART RATE: 88 BPM | BODY MASS INDEX: 36.06 KG/M2 | TEMPERATURE: 97.3 F | DIASTOLIC BLOOD PRESSURE: 79 MMHG | SYSTOLIC BLOOD PRESSURE: 133 MMHG | HEIGHT: 71 IN | WEIGHT: 257.6 LBS | OXYGEN SATURATION: 95 %

## 2021-05-06 DIAGNOSIS — E66.01 SEVERE OBESITY (BMI 35.0-39.9) WITH COMORBIDITY (HCC): ICD-10-CM

## 2021-05-06 DIAGNOSIS — Z00.00 PREVENTATIVE HEALTH CARE: Primary | ICD-10-CM

## 2021-05-06 DIAGNOSIS — E78.5 DYSLIPIDEMIA: ICD-10-CM

## 2021-05-06 DIAGNOSIS — U07.1 COVID-19 VIRUS INFECTION: ICD-10-CM

## 2021-05-06 DIAGNOSIS — Z79.4 TYPE 2 DIABETES MELLITUS WITHOUT COMPLICATION, WITH LONG-TERM CURRENT USE OF INSULIN (HCC): ICD-10-CM

## 2021-05-06 DIAGNOSIS — I10 ESSENTIAL HYPERTENSION: ICD-10-CM

## 2021-05-06 DIAGNOSIS — E11.9 TYPE 2 DIABETES MELLITUS WITHOUT COMPLICATION, WITH LONG-TERM CURRENT USE OF INSULIN (HCC): ICD-10-CM

## 2021-05-06 PROCEDURE — 99396 PREV VISIT EST AGE 40-64: CPT | Performed by: INTERNAL MEDICINE

## 2021-05-06 NOTE — PROGRESS NOTES
1. Have you been to the ER, urgent care clinic since your last visit? Hospitalized since your last visit? No    2. Have you seen or consulted any other health care providers outside of the [address] [place] [address] since your last visit? Include any pap smears or colon screening.  No

## 2021-05-06 NOTE — PROGRESS NOTES
SPORTS MEDICINE AND PRIMARY CARE  Josiane Garcia MD, [address] [address]  Phone:  [phone]  Fax: [phone]       Chief Complaint   Patient presents with    Annual Exam   .      SUBJECTIVE:    Judy Matos is a 56 y.o. male Comes in today for his annual physical.  He has a known history of obesity, type 2 diabetes, hypertension, dyslipidemia, and is seen for evaluation. Current Outpatient Medications   Medication Sig Dispense Refill    valsartan-hydroCHLOROthiazide (DIOVAN-HCT) 320-25 mg per tablet TAKE ONE TABLET BY MOUTH DAILY 90 Tab 3    simvastatin (ZOCOR) 20 mg tablet TAKE ONE TABLET BY MOUTH DAILY 90 Tab 4    liraglutide (Victoza 3-Anjel) 0.6 mg/0.1 mL (18 mg/3 mL) pnij DIAL AND INJECT SUBCUTANEOUSLY 1.8MG DAILY 9 Adjustable Dose Pre-filled Pen Syringe 11    OneTouch Ultra Blue Test Strip strip USE TO TEST FOR BLOOD SUGAR FOUR TIMES A  Strip 11    metFORMIN ER (GLUCOPHAGE XR) 500 mg tablet TAKE ONE TABLET BY MOUTH TWICE A  Tab 7    Lantus U-100 Insulin 100 unit/mL injection INJECT 74 UNITS UNDER THE SKIN ONCE DAILY BEFORE SUPPER 60 Vial 11    insulin lispro protamin-lispro (HumaLOG Mix 75-25 KwikPen) flexpen 45 Units by SubCUTAneous route two (2) times a day. 27 Adjustable Dose Pre-filled Pen Syringe 11    insulin syringe-needle U-100 1 mL 31 gauge x 15/64\" syrg 1 Units by Does Not Apply route two (2) times a day. 180 Pen Needle 11    Insulin Syringe-Needle U-100 1 mL 31 gauge x 5/16 syrg USE WITH LANTUS INSULIN DAILY 100 Syringe 11    Insulin Needles, Disposable, (ESTEPHANIA PEN NEEDLE) 32 gauge x 5/32\" ndle USE TO INJECT INSULIN EVERY NIGHT 100 Pen Needle 11    mesalamine EC (ASACOL) 400 mg EC tablet Take  by mouth three (3) times daily.        Past Medical History:   Diagnosis Date    Diabetes ([address]     Dyslipidemia     Hypertension     Obesity     Preventative health care 06/02/2017    Preventative health care     S/P colonoscopy 2016    Ulcerative colitis ([facility] 1-15-15    md melodie +     Past Surgical History:   Procedure Laterality Date    HX COLONOSCOPY       No Known Allergies      REVIEW OF SYSTEMS:  General: negative for - chills or fever  ENT: negative for - headaches, nasal congestion or tinnitus  Respiratory: negative for - cough, hemoptysis, shortness of breath or wheezing  Cardiovascular : negative for - chest pain, edema, palpitations or shortness of breath  Gastrointestinal: negative for - abdominal pain, blood in stools, heartburn or nausea/vomiting  Genito-Urinary: no dysuria, trouble voiding, or hematuria  Musculoskeletal: negative for - gait disturbance, joint pain, joint stiffness or joint swelling  Neurological: no TIA or stroke symptoms  Hematologic: no bruises, no bleeding, no swollen glands  Integument: no lumps, mole changes, nail changes or rash  Endocrine: no malaise/lethargy or unexpected weight changes      Social History     Socioeconomic History    Marital status:      Spouse name: Not on file    Number of children: Not on file    Years of education: Not on file    Highest education level: Not on file   Tobacco Use    Smoking status: Never Smoker    Smokeless tobacco: Never Used   Substance and Sexual Activity    Alcohol use: Yes     Alcohol/week: 1.0 standard drinks     Types: 1 Glasses of wine per week     Frequency: Monthly or less     Drinks per session: 1 or 2     Comment: occasional    Drug use: No    Sexual activity: Yes     Partners: Female     Birth control/protection: None   Social History Narrative    Medical History: Diabetes mellitus type 2Primary hypertensionDyslipidemiaObesityher a 17  chest CT negative for tumor    Surgical History: colonoscopy and polypectomy 2009    Hospitalization/Major Diagnostic Procedure: No Hospitalization History. Family History:  Mother:  54 yrs transitional cell carcinoma hypertension diabetes Father:  68 yrs Stage renal disease    hypertension diabetes CLL TIA Sister(s): alive Brother(s): alive Diabetes, hypertension polyps Daughter(s): alive 25 yrs w /m fall Son(s): alive 24    Yrs investment banking - hedgefund in [facility]  2 brother(s) , 1 sister(s) . 1 son [employer] , 1 daughter(s) . 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 Smoking Status Patient is a current some day smoker, Received cessation counseling on: 07/03/2013. Marital Status: . Lives w ith: spouse. Occupation/W ork: employed full time nephologist. Education/School: has highschool diploma, has college diploma md.     Family History   Problem Relation Age of Onset    Cancer Mother        OBJECTIVE:    Visit Vitals  /79   Pulse 88   Temp 97.3 °F (36.3 °C) (Oral)   Resp 20   Ht 5' 11\" (1.803 m)   Wt 257 lb 9.6 oz (116.8 kg)   SpO2 95%   BMI 35.93 kg/m²     CONSTITUTIONAL: well , well nourished, appears age appropriate  EYES: perrla, eom intact  ENMT:moist mucous membranes, pharynx clear  NECK: supple. Thyroid normal  RESPIRATORY: Chest: clear bilaterally   CARDIOVASCULAR: Heart: regular rate and rhythm  GASTROINTESTINAL: Abdomen: soft, bowel sounds active  HEMATOLOGIC: no pathological lymph nodes palpated  MUSCULOSKELETAL: Extremities: no edema, pulse 1+   INTEGUMENT: No unusual rashes or suspicious skin lesions noted. Nails appear normal.  NEUROLOGIC: non-focal exam   MENTAL STATUS: alert and oriented, appropriate affect           ASSESSMENT:  1. Preventative health care    2. Type 2 diabetes mellitus without complication, with long-term current use of insulin (HCC)    3. COVID-19 virus infection    4. Severe obesity (BMI 35.0-39. 9) with comorbidity ([address]    5. Essential hypertension    6. Dyslipidemia      This completes his preventive healthcare visit. He is doing well.   We encourage physical activity 30 minutes five days a week and a heart healthy, diabetic diet. We will assess blood sugar control with hemoglobin a1c. Last time his hemoglobin A1c was a little higher than I would like to see it. He agrees. He had no complications as a result of his COVID infection. He has also had his vaccines. Obesity remains a challenge over the years. He is exercising on a regular basis with his wife or going to the gym. We encouraged him to continue to do that. Blood pressure control is at goal.    He will be back to see us in one year, sooner if he has any problems. He will have his labs checked in November. I have discussed the diagnosis with the patient and the intended plan as seen in the  Orders. The patient understands and agees with the plan. The patient has   received an after visit summary and questions were answered concerning  future plans  Patient labs and/or xrays were reviewed  Past records were reviewed. PLAN:  .  Orders Placed This Encounter    HEMOGLOBIN A1C WITH EAG                  ATTENTION:   This medical record was transcribed using an electronic medical records system. Although proofread, it may and can contain electronic and spelling errors. Other human spelling and other errors may be present. Corrections may be executed at a later time. Please feel free to contact us for any clarifications as needed.

## 2021-05-07 LAB
EST. AVERAGE GLUCOSE BLD GHB EST-MCNC: 194 MG/DL
HBA1C MFR BLD: 8.4 % (ref 4.8–5.6)

## 2021-06-06 RX ORDER — INSULIN LISPRO 100 [IU]/ML
INJECTION, SUSPENSION SUBCUTANEOUS
Qty: 45 ADJUSTABLE DOSE PRE-FILLED PEN SYRINGE | Refills: 11 | Status: SHIPPED | OUTPATIENT
Start: 2021-06-06 | End: 2021-06-14

## 2021-06-09 RX ORDER — INSULIN LISPRO 100 [IU]/ML
45 INJECTION, SUSPENSION SUBCUTANEOUS 2 TIMES DAILY
Qty: 27 ADJUSTABLE DOSE PRE-FILLED PEN SYRINGE | Refills: 11 | Status: CANCELLED | OUTPATIENT
Start: 2021-06-09

## 2021-06-14 RX ORDER — INSULIN LISPRO 100 [IU]/ML
INJECTION, SUSPENSION SUBCUTANEOUS
Qty: 45 ADJUSTABLE DOSE PRE-FILLED PEN SYRINGE | Refills: 10 | Status: SHIPPED | OUTPATIENT
Start: 2021-06-14 | End: 2022-06-23

## 2021-07-25 RX ORDER — SYRING-NEEDL,DISP,INSUL,0.3 ML 31GX15/64"
SYRINGE, EMPTY DISPOSABLE MISCELLANEOUS
Qty: 180 PEN NEEDLE | Refills: 10 | Status: SHIPPED | OUTPATIENT
Start: 2021-07-25

## 2021-09-22 RX ORDER — METFORMIN HYDROCHLORIDE 500 MG/1
TABLET, EXTENDED RELEASE ORAL
Qty: 180 TABLET | Refills: 7 | Status: SHIPPED | OUTPATIENT
Start: 2021-09-22

## 2021-09-29 RX ORDER — BLOOD SUGAR DIAGNOSTIC
STRIP MISCELLANEOUS
Qty: 100 STRIP | Refills: 11 | Status: SHIPPED | OUTPATIENT
Start: 2021-09-29

## 2022-02-07 RX ORDER — TELMISARTAN AND HYDROCHLORTHIAZIDE 80; 12.5 MG/1; MG/1
1 TABLET ORAL DAILY
Qty: 90 TABLET | Refills: 3 | Status: SHIPPED | OUTPATIENT
Start: 2022-02-07

## 2022-02-17 ENCOUNTER — ANESTHESIA EVENT (OUTPATIENT)
Dept: SURGERY | Age: 62
End: 2022-02-17
Payer: COMMERCIAL

## 2022-02-21 NOTE — H&P
G I Procedure Note           Endoscopy History and Physical           Dr. David Souza Office laurie Palominoon [address] [place] [address],  [phone]  [id number]    1960  59 y.o.  male      Date of Procedure:   Preoperative Diagnosis:       Procedure:   2/22/2022      COLON SCREENING                          Procedure(s):  COLONOSCOPY      Gastroenterologist:  Anesthesia:           MD PATRICIA Silverman            History and procedure indication:  Boris Castaneda Dr. is a 59 y.o. BLACK/ male who presents with: COLON SCREENING   including the additional history of Screening ,Screening for colon cancer, hx of uc,,        Past Medical History:   Diagnosis Date    Diabetes ([facility]     Dyslipidemia     Hypertension     Obesity     Preventative health care 06/02/2017    Preventative health care     S/P colonoscopy 2016    Ulcerative colitis ([facility] 1-15-15    md melodie +      Prior to Admission medications    Medication Sig Start Date End Date Taking? Authorizing Provider   telmisartan-hydroCHLOROthiazide (MICARDIS HCT) 80-12.5 mg per tablet Take 1 Tablet by mouth daily.  2/7/22   Ines Gonzalez MD   OneTouch Ultra Test strip USE TO TEST BLOOD SUGAR FOUR TIMES A DAY 9/29/21   Ines Gonzalez MD   metFORMIN ER (GLUCOPHAGE XR) 500 mg tablet TAKE ONE TABLET BY MOUTH TWICE A DAY 9/22/21   Ines Gonzalez MD   BD Veo Insulin Syringe UF 1 mL 31 gauge x 15/64\" syrg INJECT ONE UNIT UNDER THE SKIN TWO TIMES A DAY 7/25/21   Ines Gonzalez MD   Lantus U-100 Insulin 100 unit/mL injection INJECT 74 UNITS UNDER THE SKIN DAILY BEFORE SUPPER 7/7/21   Ines Gonzalez MD   insulin lispro protamin-lispro (HUMALOG 75-25 MIX) flexpen INJECT UNDER THE SKIN 70 UNITS TWO TIMES A DAY 6/14/21   Ciaran Ortega MD   simvastatin (ZOCOR) 20 mg tablet TAKE ONE TABLET BY MOUTH DAILY 3/30/21   Lorraine Gonzalez MD   liraglutide (Victoza 3-Anjel) 0.6 mg/0.1 mL (18 mg/3 mL) pnij DIAL AND INJECT SUBCUTANEOUSLY 1.8MG DAILY 3/26/21   Lorraine Gonzalez MD   Insulin Syringe-Needle U-100 1 mL 31 gauge x 5/16 syrg USE WITH LANTUS INSULIN DAILY 8/15/17   Lorraine Gonzalez MD   Insulin Needles, Disposable, (ESTEPHANIA PEN NEEDLE) 32 gauge x 5/32\" ndle USE TO INJECT INSULIN EVERY NIGHT 3/18/16   Wilda Stein MD   mesalamine EC (ASACOL) 400 mg EC tablet Take  by mouth three (3) times daily. Provider, Historical     No Known Allergies    Past Surgical History:   Procedure Laterality Date    HX COLONOSCOPY       Family History   Problem Relation Age of Onset    Cancer Mother       Social History     Tobacco Use    Smoking status: Never Smoker    Smokeless tobacco: Never Used   Substance Use Topics    Alcohol use: Yes     Alcohol/week: 1.0 standard drink     Types: 1 Glasses of wine per week     Comment: occasional                                                    PHYSICAL EXAM   There were no vitals taken for this visit.     General appearance:  alert,  in no distress  Mental status:  normal mood, behavior, speech, dress, motor activity and thought processes  Nose:      normal and patent, no erythema, discharge    Mouth:- mucous membranes moist, pharynx normal without lesions                  [x]  No Loose teeth      []    Loose teeth  Finger opening:  []1     []1.5    [] 2     [] 2.5     [x] 3      [] 3.5     [] 4   Mallampati:         [] Class 1     [x] Class 2    [] Class 3      [] Class 4      Neck - supple,      [x] Full ROM [] Decreased ROM  [] Short Neck no significant adenopathy    Chest - clear to auscultation, no wheezes, rales or rhonchi, symmetric air entry  Heart: normal rate, regular rhythm, normal S1, S2, no murmurs,   Abdomen: abdomen soft, bowel sounds  [x] normal  [] increased  [] hypoactive                   [x] no tenderness  [] epigastric tenderness  [] LLQ tenderness   [] RLQ tenderness                      No masses, organomegaly or guarding. Rectal exam: negative without mass, lesions or tenderness  Extremities:  , no pedal edema, no  cyanosis  Neurologic: Alert and oriented to person, place, and time;                          Normal symmetric reflexes  Normal gait:                                      Assessement:                                 Pre op dx:  COLON SCREENING   Additional medical problems list below   Patient Active Problem List   Diagnosis Code    Dyslipidemia E78.5    Ulcerative colitis ([facility] K51.90    Hypertension I10    S/P colonoscopy Z98.890    Preventative health care Z00.00    Type 2 diabetes mellitus without complication, with long-term current use of insulin (HCC) E11.9, Z79.4    Severe obesity (BMI 35.0-39. 9) with comorbidity ([facility] E66.01    COVID-19 virus infection U07.1                                                                                           This note documentation was performed prior to this planned procedure       after a history and physical was performed in the office.          Date: 2 13 22    Office exam   2/22/2022 Immediate update no changes in H&P                        Pre Procedure Evaluation (per anesthesia or per h&p)                                                Sedation/Assessment:                                                                                               Mallampati Classification                            []Class 1                    []Class 2                    [] Class 3                  [] Class 4                                              ASA classfication         []     Class I: Normally healthy         []     Class II: Patient with mild systemic disease (e.g. hypertension)         []     Class III: Patient with severe systemic disease (e.g. CHF), non-decompensated         []     Class IV: Patient with severe systemic disease, decompensated         [] Class V: Moribund patient, survival unlikely                     Plan:   []    Egd                                [x]  Colonoscopy                                [] with Moderate Sedation /Conscious Sedation                                  [x] MAC          Patient stable for planned procedure. See orders.      Reyna Tyson MD

## 2022-02-22 ENCOUNTER — HOSPITAL ENCOUNTER (OUTPATIENT)
Age: 62
Setting detail: OUTPATIENT SURGERY
Discharge: HOME | End: 2022-02-22
Attending: INTERNAL MEDICINE | Admitting: INTERNAL MEDICINE
Payer: COMMERCIAL

## 2022-02-22 ENCOUNTER — ANESTHESIA (OUTPATIENT)
Dept: SURGERY | Age: 62
End: 2022-02-22
Payer: COMMERCIAL

## 2022-02-22 VITALS
SYSTOLIC BLOOD PRESSURE: 152 MMHG | BODY MASS INDEX: 35.56 KG/M2 | HEART RATE: 72 BPM | TEMPERATURE: 97.7 F | DIASTOLIC BLOOD PRESSURE: 90 MMHG | HEIGHT: 71 IN | WEIGHT: 254 LBS | RESPIRATION RATE: 17 BRPM | OXYGEN SATURATION: 97 %

## 2022-02-22 LAB
FLUAV RNA SPEC QL NAA+PROBE: NOT DETECTED
FLUBV RNA SPEC QL NAA+PROBE: NOT DETECTED
GLUCOSE BLD STRIP.AUTO-MCNC: 164 MG/DL (ref 65–117)
SARS-COV-2, COV2: NOT DETECTED
SERVICE CMNT-IMP: ABNORMAL

## 2022-02-22 PROCEDURE — 76010000138 HC OR TIME 0.5 TO 1 HR: Performed by: INTERNAL MEDICINE

## 2022-02-22 PROCEDURE — 74011250636 HC RX REV CODE- 250/636: Performed by: ANESTHESIOLOGY

## 2022-02-22 PROCEDURE — 87636 SARSCOV2 & INF A&B AMP PRB: CPT

## 2022-02-22 PROCEDURE — 76060000032 HC ANESTHESIA 0.5 TO 1 HR: Performed by: INTERNAL MEDICINE

## 2022-02-22 PROCEDURE — 77030021593 HC FCPS BIOP ENDOSC BSC -A: Performed by: INTERNAL MEDICINE

## 2022-02-22 PROCEDURE — 82962 GLUCOSE BLOOD TEST: CPT

## 2022-02-22 PROCEDURE — 2709999900 HC NON-CHARGEABLE SUPPLY: Performed by: INTERNAL MEDICINE

## 2022-02-22 PROCEDURE — 88305 TISSUE EXAM BY PATHOLOGIST: CPT

## 2022-02-22 PROCEDURE — 76210000020 HC REC RM PH II FIRST 0.5 HR: Performed by: INTERNAL MEDICINE

## 2022-02-22 PROCEDURE — 74011000250 HC RX REV CODE- 250: Performed by: ANESTHESIOLOGY

## 2022-02-22 RX ORDER — SODIUM CHLORIDE 0.9 % (FLUSH) 0.9 %
5-40 SYRINGE (ML) INJECTION AS NEEDED
Status: DISCONTINUED | OUTPATIENT
Start: 2022-02-22 | End: 2022-02-22 | Stop reason: HOSPADM

## 2022-02-22 RX ORDER — DEXTROSE MONOHYDRATE AND SODIUM CHLORIDE 5; .9 G/100ML; G/100ML
100 INJECTION, SOLUTION INTRAVENOUS CONTINUOUS
Status: DISCONTINUED | OUTPATIENT
Start: 2022-02-22 | End: 2022-02-22 | Stop reason: HOSPADM

## 2022-02-22 RX ORDER — MIDAZOLAM HYDROCHLORIDE 1 MG/ML
5 INJECTION, SOLUTION INTRAMUSCULAR; INTRAVENOUS
Status: DISCONTINUED | OUTPATIENT
Start: 2022-02-22 | End: 2022-02-22 | Stop reason: HOSPADM

## 2022-02-22 RX ORDER — SODIUM CHLORIDE, SODIUM LACTATE, POTASSIUM CHLORIDE, CALCIUM CHLORIDE 600; 310; 30; 20 MG/100ML; MG/100ML; MG/100ML; MG/100ML
50 INJECTION, SOLUTION INTRAVENOUS CONTINUOUS
Status: DISCONTINUED | OUTPATIENT
Start: 2022-02-22 | End: 2022-02-22 | Stop reason: HOSPADM

## 2022-02-22 RX ORDER — DEXTROMETHORPHAN/PSEUDOEPHED 2.5-7.5/.8
1.2 DROPS ORAL
Status: DISCONTINUED | OUTPATIENT
Start: 2022-02-22 | End: 2022-02-22 | Stop reason: HOSPADM

## 2022-02-22 RX ORDER — SODIUM CHLORIDE 0.9 % (FLUSH) 0.9 %
5-40 SYRINGE (ML) INJECTION EVERY 8 HOURS
Status: DISCONTINUED | OUTPATIENT
Start: 2022-02-22 | End: 2022-02-22 | Stop reason: HOSPADM

## 2022-02-22 RX ORDER — NALOXONE HYDROCHLORIDE 0.4 MG/ML
0.4 INJECTION, SOLUTION INTRAMUSCULAR; INTRAVENOUS; SUBCUTANEOUS
Status: DISCONTINUED | OUTPATIENT
Start: 2022-02-22 | End: 2022-02-22 | Stop reason: HOSPADM

## 2022-02-22 RX ORDER — LIDOCAINE HYDROCHLORIDE 20 MG/ML
INJECTION, SOLUTION INFILTRATION; PERINEURAL AS NEEDED
Status: DISCONTINUED | OUTPATIENT
Start: 2022-02-22 | End: 2022-02-22 | Stop reason: HOSPADM

## 2022-02-22 RX ORDER — SODIUM CHLORIDE, SODIUM LACTATE, POTASSIUM CHLORIDE, CALCIUM CHLORIDE 600; 310; 30; 20 MG/100ML; MG/100ML; MG/100ML; MG/100ML
INJECTION, SOLUTION INTRAVENOUS
Status: DISCONTINUED | OUTPATIENT
Start: 2022-02-22 | End: 2022-02-22 | Stop reason: HOSPADM

## 2022-02-22 RX ORDER — ATROPINE SULFATE 1 MG/ML
0.5 INJECTION, SOLUTION INTRAVENOUS
Status: DISCONTINUED | OUTPATIENT
Start: 2022-02-22 | End: 2022-02-22 | Stop reason: HOSPADM

## 2022-02-22 RX ORDER — EPINEPHRINE 0.1 MG/ML
1 INJECTION INTRACARDIAC; INTRAVENOUS
Status: DISCONTINUED | OUTPATIENT
Start: 2022-02-22 | End: 2022-02-22 | Stop reason: HOSPADM

## 2022-02-22 RX ORDER — LIDOCAINE HYDROCHLORIDE 20 MG/ML
5 SOLUTION OROPHARYNGEAL AS NEEDED
Status: DISCONTINUED | OUTPATIENT
Start: 2022-02-22 | End: 2022-02-22 | Stop reason: HOSPADM

## 2022-02-22 RX ORDER — SODIUM CHLORIDE 9 MG/ML
100 INJECTION, SOLUTION INTRAVENOUS CONTINUOUS
Status: DISCONTINUED | OUTPATIENT
Start: 2022-02-22 | End: 2022-02-22 | Stop reason: HOSPADM

## 2022-02-22 RX ORDER — SODIUM CHLORIDE 9 MG/ML
50 INJECTION, SOLUTION INTRAVENOUS CONTINUOUS
Status: DISCONTINUED | OUTPATIENT
Start: 2022-02-22 | End: 2022-02-22 | Stop reason: HOSPADM

## 2022-02-22 RX ORDER — FLUMAZENIL 0.1 MG/ML
0.2 INJECTION INTRAVENOUS
Status: DISCONTINUED | OUTPATIENT
Start: 2022-02-22 | End: 2022-02-22 | Stop reason: HOSPADM

## 2022-02-22 RX ORDER — FENTANYL CITRATE 50 UG/ML
100 INJECTION, SOLUTION INTRAMUSCULAR; INTRAVENOUS ONCE
Status: DISCONTINUED | OUTPATIENT
Start: 2022-02-22 | End: 2022-02-22 | Stop reason: HOSPADM

## 2022-02-22 RX ORDER — PROPOFOL 10 MG/ML
INJECTION, EMULSION INTRAVENOUS AS NEEDED
Status: DISCONTINUED | OUTPATIENT
Start: 2022-02-22 | End: 2022-02-22 | Stop reason: HOSPADM

## 2022-02-22 RX ADMIN — PROPOFOL 10 MG: 10 INJECTION, EMULSION INTRAVENOUS at 12:45

## 2022-02-22 RX ADMIN — PROPOFOL 10 MG: 10 INJECTION, EMULSION INTRAVENOUS at 12:37

## 2022-02-22 RX ADMIN — PROPOFOL 10 MG: 10 INJECTION, EMULSION INTRAVENOUS at 12:47

## 2022-02-22 RX ADMIN — PROPOFOL 10 MG: 10 INJECTION, EMULSION INTRAVENOUS at 12:39

## 2022-02-22 RX ADMIN — PROPOFOL 10 MG: 10 INJECTION, EMULSION INTRAVENOUS at 12:32

## 2022-02-22 RX ADMIN — PROPOFOL 10 MG: 10 INJECTION, EMULSION INTRAVENOUS at 12:34

## 2022-02-22 RX ADMIN — PROPOFOL 100 MG: 10 INJECTION, EMULSION INTRAVENOUS at 12:31

## 2022-02-22 RX ADMIN — PROPOFOL 10 MG: 10 INJECTION, EMULSION INTRAVENOUS at 12:41

## 2022-02-22 RX ADMIN — PROPOFOL 10 MG: 10 INJECTION, EMULSION INTRAVENOUS at 12:36

## 2022-02-22 RX ADMIN — PROPOFOL 10 MG: 10 INJECTION, EMULSION INTRAVENOUS at 12:43

## 2022-02-22 RX ADMIN — PROPOFOL 10 MG: 10 INJECTION, EMULSION INTRAVENOUS at 12:33

## 2022-02-22 RX ADMIN — PROPOFOL 10 MG: 10 INJECTION, EMULSION INTRAVENOUS at 12:42

## 2022-02-22 RX ADMIN — PROPOFOL 10 MG: 10 INJECTION, EMULSION INTRAVENOUS at 12:53

## 2022-02-22 RX ADMIN — PROPOFOL 10 MG: 10 INJECTION, EMULSION INTRAVENOUS at 12:35

## 2022-02-22 RX ADMIN — PROPOFOL 10 MG: 10 INJECTION, EMULSION INTRAVENOUS at 12:40

## 2022-02-22 RX ADMIN — LIDOCAINE HYDROCHLORIDE 60 MG: 20 INJECTION, SOLUTION INFILTRATION; PERINEURAL at 12:31

## 2022-02-22 RX ADMIN — PROPOFOL 10 MG: 10 INJECTION, EMULSION INTRAVENOUS at 12:38

## 2022-02-22 RX ADMIN — PROPOFOL 10 MG: 10 INJECTION, EMULSION INTRAVENOUS at 12:51

## 2022-02-22 RX ADMIN — PROPOFOL 10 MG: 10 INJECTION, EMULSION INTRAVENOUS at 12:49

## 2022-02-22 RX ADMIN — SODIUM CHLORIDE, POTASSIUM CHLORIDE, SODIUM LACTATE AND CALCIUM CHLORIDE: 600; 310; 30; 20 INJECTION, SOLUTION INTRAVENOUS at 12:22

## 2022-02-22 NOTE — DISCHARGE INSTRUCTIONS
Endoscopy Discharge Instructions     Dr. Kuhn [place] office                                            NAME: Dr. Denis Engel IACXG    AGE:  59 y.o. YOB: 1960                                                              FINAL Discharge Procedure and Diagnosis:       Procedure(s):  COLONOSCOPY       FINDINGS:     Rectal polyp x 2 removed benign 4mm                                         MEDICATIONS    [x] CONTINUE CURRENT MEDICATIONS     [] NEW MEDICATIONS           1.    2.    3.         Testing   Schedule              Colonoscopy Screening                                   Recommendations       []  Repeat colonoscopy in 6-12 month         2nd to Inadequate  prep    []  Repeat colonoscopy in 3 years    [x]  Repeat colonoscopy in 5 years    []  Repeat colonoscopy in 10 years         New additional  Tests  Call the office   [phone] for the appointment time      []      []      []                                     YOUR NEXT APPOINTMENT WITH DR Crow Cardona:                                                                                                                                [x]   None follow up with pcp   []  1 week       []   2 week    []  1 month    Always keep Natalie Lee MD for regular medical follow up                                                                                                                         If you had a colonoscopy the \"C\" indicates specific instructions        x                                           Diet Instructions :   Ordinarily you may resume your previous diet but your initial diet should be       Light your discharge nurse will go over this with you. Large meals can cause  abdominal discomfort after these procedures.                                                                            Specific Diet Recommendations:        [x] High fiber diet. https://www.[employer]/. com/diets/        [] GERD diet: avoid fried and fatty foods, peppermint, chocolate, alcohol,               coffee, citrus fruits and juices, and tomato products. Avoid lying down for            2 to 3  hours after eating. https://www.[employer].com/. com/diets/            []  FODMAP DIET  Deathit[employer]nl              []  All diets eg high fiber, gastroparesis. , weight loss , gluten free             1. [employer]              2.  https://www.[employer]. [employer]/diets/           __x__  Elvira Segovia may feel quite tired and need to rest and recuperate for several hours    following these procedures. __x__  Due to the fact that sedation was administered for this procedure, do not drive,   operate machinery or sign legal documents for the next 24 hours. __x__  Mild abdominal pain may be experienced after your procedure, but is should   disappear after several hours. Notify your physician if you have persistent pain,   tenderness or abdominal distension. __x__  C    Many patients for the first few hours following the exam may experience         belching or passing gas through the rectum. Walking may help to relieve        distention and gas pains. A warm bath or shower will often help with abdominal  cramping.                                                                                            __x__   Elvira Wilkersoning may return to your normal routine tomorrow, according to how you feel        and depending on your doctors instructions. Be sure to call your doctor to make  an appointment for a post-surgery check-up on the date your doctor has   requested. __x__ C     Rectal bleeding or spotting in small amounts may occur with the first bowel   movement following a colonoscopy or sigmoidoscopy.  If a large amount of blood is noted call immediately     __x__  You may experience a numbness or lack of sensation in throat. If present, do not     eat or drink. Before eating, test your ability to drink with small sips of water. Y     You may try clear liquids or soups. If you tolerate these, you may then eat solid     food which is not greasy or spicy. __x__ C     IF POLYPS REMOVED: Avoid any blood thinning medication such as plavix,   aspirin or coumadin  NSAIDS (like advil or alleve) for 7 days. __x__  Notify your physician if you cough or vomit blood or experience chest pain. Your biopsy or testing result should be available in 7-10 days                                                                                                                      Prescription will be electronically sent to your pharmacy you must     let your nurse know your pharmacy:                                                                                                                                          [address] [place] [address]. TO HELP ENSURE A SMOOTH RECOVERY,       IT IS IMPORTANT TO FOLLOW THEM. _x___Pamphlet /Educational Information provided for diagnostic findings     Additional education information can assessed at the sites below:   [email or web address]   http://www.digestive. niddk.nih.gov/ddiseases/a-z.asp      Web MD patient information                                                                                                Signature of individual given instructions :   Date: 2/21/2022                                                                                                                            Patient Education        Learning About Coronavirus ([id number])  What is coronavirus (COVID-19)? COVID-19 is a disease caused by a type of coronavirus.  This illness was first found in December 2019. It has since spread worldwide. Coronaviruses are a large group of viruses. They cause the common cold. They also cause more serious illnesses like Middle East respiratory syndrome (MERS) and severe acute respiratory syndrome (SARS). COVID-19 is caused by a novel coronavirus. That means it's a new type that has not been seen in people before. What are the symptoms? COVID-19 symptoms may include:  · Fever. · Cough. · Trouble breathing. · Chills or repeated shaking with chills. · Muscle and body aches. · Headache. · Sore throat. · New loss of taste or smell. · Vomiting. · Diarrhea. In severe cases, COVID-19 can cause pneumonia and make it hard to breathe without help from a machine. It can cause death. How is it diagnosed? COVID-19 is diagnosed with a viral test. This may also be called a PCR test or antigen test. It looks for evidence of the virus in your breathing passages or lungs (respiratory system). The test is most often done on a sample from the nose, throat, or lungs. It's sometimes done on a sample of saliva. One way a sample is collected is by putting a long swab into the back of your nose. How is it treated? Mild cases of COVID-19 can be treated at home. Serious cases need treatment in the hospital. Treatment may include medicines to reduce symptoms, plus breathing support such as oxygen therapy or a ventilator. Some people may be placed on their belly to help their oxygen levels. Treatments that may help people who have COVID-19 include:  Antiviral medicines. These medicines treat viral infections. Remdesivir is an example. Immune-based therapy. These medicines help the immune system fight COVID-19. Examples include monoclonal antibodies. Blood thinners. These medicines help prevent blood clots. People with severe illness are at risk for blood clots. How can you protect yourself and others?   The best way to protect yourself from getting sick is to:  · Get vaccinated. · Avoid sick people. · If you are not fully vaccinated:  ? Wear a mask if you have to go to public areas. ? Avoid crowds and try to stay at least 6 feet away from other people. · Cover your mouth with a tissue when you cough or sneeze. · Wash your hands often, especially after you cough or sneeze. Use soap and water, and scrub for at least 20 seconds. If soap and water aren't available, use an alcohol-based hand . · Avoid touching your mouth, nose, and eyes. To help avoid spreading the virus to others:  · Get vaccinated. · Cover your mouth with a tissue when you cough or sneeze. · Wash your hands often, especially after you cough or sneeze. Use soap and water, and scrub for at least 20 seconds. If soap and water aren't available, use an alcohol-based hand . · If you have been exposed to the virus and are not fully vaccinated:  ? Stay home. Don't go to school, work, or public areas. And don't use public transportation, ride-shares, or taxis unless you have no choice. ? Wear a mask if you have to go to public areas, like the pharmacy. · If you're sick:  ? Leave your home only if you need to get medical care. But call the doctor's office first so they know you're coming. And wear a mask. ? Wear a mask whenever you're around other people. ? Limit contact with pets and people in your home. If possible, stay in a separate bedroom and use a separate bathroom. ? Clean and disinfect your home every day. Use household  and disinfectant wipes or sprays. Take special care to clean things that you touch with your hands. How can you self-isolate when you have COVID-19? If you have COVID-19, there are things you can do to help avoid spreading the virus to others. · Limit contact with people in your home. If possible, stay in a separate bedroom and use a separate bathroom. · Wear a mask when you are around other people.   · If you have to leave home, avoid crowds and try to stay at least 6 feet away from other people. · Avoid contact with pets and other animals. · Cover your mouth and nose with a tissue when you cough or sneeze. Then throw it in the trash right away. · Wash your hands often, especially after you cough or sneeze. Use soap and water, and scrub for at least 20 seconds. If soap and water aren't available, use an alcohol-based hand . · Don't share personal household items. These include bedding, towels, cups and glasses, and eating utensils. · [address] in the warmest water allowed for the fabric type, and dry it completely. It's okay to wash other people's laundry with yours. · Clean and disinfect your home. Use household  and disinfectant wipes or sprays. When should you call for help? Call 911 anytime you think you may need emergency care. For example, call if you have life-threatening symptoms, such as:    · You have severe trouble breathing. (You can't talk at all.)     · You have constant chest pain or pressure.     · You are severely dizzy or lightheaded.     · You are confused or can't think clearly.     · You have pale, gray, or blue-colored skin or lips.     · You pass out (lose consciousness) or are very hard to wake up. Call your doctor now or seek immediate medical care if:    · You have moderate trouble breathing. (You can't speak a full sentence.)     · You are coughing up blood (more than about 1 teaspoon).     · You have signs of low blood pressure. These include feeling lightheaded; being too weak to stand; and having cold, pale, clammy skin. Watch closely for changes in your health, and be sure to contact your doctor if:    · Your symptoms get worse.     · You are not getting better as expected.     · You have new or worse symptoms of anxiety, depression, nightmares, or flashbacks. Call before you go to the doctor's office. Follow their instructions. And wear a mask.   Current as of: July 1, 2021               Content Version: 13.0  © 2006-2021 [employer]. Care instructions adapted under license by [employer] (which disclaims liability or warranty for this information). If you have questions about a medical condition or this instruction, always ask your healthcare professional. [address] any warranty or liability for your use of this information. DISCHARGE SUMMARY from Nurse    PATIENT INSTRUCTIONS:    After general anesthesia or intravenous sedation, for 24 hours or while taking prescription Narcotics:  · Limit your activities  · Do not drive and operate hazardous machinery  · Do not make important personal or business decisions  · Do  not drink alcoholic beverages  · If you have not urinated within 8 hours after discharge, please contact your surgeon on call. Report the following to your surgeon:  · Excessive pain, swelling, redness or odor of or around the surgical area  · Temperature over 100.5  · Nausea and vomiting lasting longer than 4 hours or if unable to take medications  · Any signs of decreased circulation or nerve impairment to extremity: change in color, persistent  numbness, tingling, coldness or increase pain  · Any questions      These are general instructions for a healthy lifestyle:    No smoking/ No tobacco products/ Avoid exposure to second hand smoke  Surgeon General's Warning:  Quitting smoking now greatly reduces serious risk to your health. Obesity, smoking, and sedentary lifestyle greatly increases your risk for illness    A healthy diet, regular physical exercise & weight monitoring are important for maintaining a healthy lifestyle    You may be retaining fluid if you have a history of heart failure or if you experience any of the following symptoms:  Weight gain of 3 pounds or more overnight or 5 pounds in a week, increased swelling in our hands or feet or shortness of breath while lying flat in bed.   Please call your doctor as soon as you notice any of these symptoms; do not wait until your next office visit. The discharge information has been reviewed with the patient and spouse. The patient and spouse verbalized understanding. Discharge medications reviewed with the patient and spouse and appropriate educational materials and side effects teaching were provided.   ___________________________________________________________________________________________________________________________________

## 2022-02-22 NOTE — H&P
History and physical has been reviewed. The patient has been interviewed and examined.  There have been no significant clinical changes since the completion of the originally dated History and Physical.

## 2022-02-22 NOTE — PERIOP NOTES
Patient meets discharge criteria. Patient and Wife provided with verbal and written discharge instructions. Patient discharged by wheelchair with Wife to transport home.

## 2022-02-22 NOTE — ANESTHESIA POSTPROCEDURE EVALUATION
Procedure(s):  COLONOSCOPY  COLON BIOPSY. MAC    Anesthesia Post Evaluation      Multimodal analgesia: multimodal analgesia not used between 6 hours prior to anesthesia start to PACU discharge  Patient location during evaluation: PACU  Patient participation: complete - patient participated  Level of consciousness: awake and alert  Pain management: adequate  Airway patency: patent  Anesthetic complications: no  Cardiovascular status: acceptable  Respiratory status: acceptable  Hydration status: acceptable  Post anesthesia nausea and vomiting:  none  Final Post Anesthesia Temperature Assessment:  Normothermia (36.0-37.5 degrees C)      INITIAL Post-op Vital signs:   Vitals Value Taken Time   /90 02/22/22 1320   Temp 36.5 °C (97.7 °F) 02/22/22 1320   Pulse 76 02/22/22 1321   Resp 17 02/22/22 1321   SpO2 97 % 02/22/22 1321   Vitals shown include unvalidated device data.

## 2022-02-22 NOTE — ANESTHESIA PREPROCEDURE EVALUATION
Relevant Problems   CARDIOVASCULAR   (+) Hypertension      ENDOCRINE   (+) Severe obesity (BMI 35.0-39. 9) with comorbidity (HCC)   (+) Type 2 diabetes mellitus without complication, with long-term current use of insulin (HCC)       Anesthetic History   No history of anesthetic complications            Review of Systems / Medical History  Patient summary reviewed and pertinent labs reviewed    Pulmonary  Within defined limits                 Neuro/Psych   Within defined limits           Cardiovascular    Hypertension              Exercise tolerance: >4 METS     GI/Hepatic/Renal           PUD     Endo/Other    Diabetes    Morbid obesity     Other Findings              Physical Exam    Airway  Mallampati: II  TM Distance: 4 - 6 cm  Neck ROM: normal range of motion   Mouth opening: Normal     Cardiovascular    Rhythm: regular  Rate: normal         Dental    Dentition: Bridges, Upper dentition intact and Lower dentition intact     Pulmonary  Breath sounds clear to auscultation               Abdominal  GI exam deferred       Other Findings            Anesthetic Plan    ASA: 2  Anesthesia type: MAC            Anesthetic plan and risks discussed with: Patient

## 2022-02-22 NOTE — PROCEDURES
G I Procedure Note            COLONOSCOPY   Dr. Sukhjinder Paz office   [address] [facility] [address]eks [address],                                    [id number]                                  [id number]   1960                                      59 y.o.                                    male      Procedure Date: 2/22/2022   [x]  Anesthesia MAC                                                                                                Pre Op Diagnosis:    Indications:                   1. COLON SCREENING                                                                                                                                                                          Post Op Diagnosis:                    1.   Removed RECTAL POLYP 4mm, HEMORRHOIDS                                                                            H&p completed: Yes            Anesthesia Assessment: Performed prior to procedure:      No change  Anesthesia Plan: Performed prior to procedure:                   No change       Medications: See Reviewed List and Reconcilation           Informed consent was obtained     Risk Statement:  Prior to the procedure the risks were explained to the patient and/or to the family including but not limited to perforation, bleeding, adverse drug reaction, aspiration, and even the need for possible surgery. A colonoscopy exam is not 100% accurate which may be related to preparation or blind spots during the exam.The possibility that an abnormality and /or cancer could be missed was also discussed as well as alternative x-ray options.          Instrument:    Olympus adult Videocolonoscope                                   Immediate Procedure Reassessment Completed     With the patient in the left lateral position, a rectal examination was performed and the findings were: negative without mass, lesions or tenderness   The Olympus Video colonoscope was inserted under direct vision into the rectum. The colonoscope was passed from the rectum to the cecum, which was identified by the ileocecal valve. The colon findings demonstrated:  ANUS: Anal exam reveals no masses or external hemorrhoids, sphincter tone is normal.   RECTUM: Rectal exam reveals no masses  . SIGMOID COLON: The sigmoid was unremarkable except as noted below   Findings below   DESCENDING COLON:  The videoscolonoscope was advanced carefully. Findings below  SPLENIC FLEXURE: The splenic flexure is normal.   TRANSVERSE COLON:  The typical triangular pattern was noted. Findings below      HEPATIC FLEXURE: The hepatic flexure is normal.   ASCENDING COLON:  No  bleeding Findings below     CECUM:  The ileocecal valve appears normal.   TERMINAL ILEUM: The terminal ileum was not entered. Finding noted      [x] mucosa normal      [] Diverticulosis     [] avm     [] Additional findings:      A         A polyp was identified. #4,   mm in size x2 , located in the rectum removed by cold biopsy and sent for pathology The polypectomy site was reviewed and was free of hemorrhage. The colonoscope was slowly withdrawn >6 minute period and the instrument was retroflexed in the rectum. The rectal findings were:Protruding lesions:     -Internal Hemorrhoids  The patient tolerated the entire procedure well. Blood Loss nil  No complications  Anesthesia  MAC  No crystalloids  No Implants  Assistants : per nursing documentation team members     For biopsy  Specimen verification by physician and nurse two sources, name,           social security numbers     Colon preparation was good    Recommendations:     - Repeat colonoscopy in 5 years.       Copies sent to   Olivia Bear MD  CC:  Hedy Han MD

## 2022-03-18 PROBLEM — E11.9 TYPE 2 DIABETES MELLITUS WITHOUT COMPLICATION, WITH LONG-TERM CURRENT USE OF INSULIN (HCC): Status: ACTIVE | Noted: 2018-05-18

## 2022-03-18 PROBLEM — Z79.4 TYPE 2 DIABETES MELLITUS WITHOUT COMPLICATION, WITH LONG-TERM CURRENT USE OF INSULIN (HCC): Status: ACTIVE | Noted: 2018-05-18

## 2022-03-18 PROBLEM — E66.01 SEVERE OBESITY (BMI 35.0-39.9) WITH COMORBIDITY (HCC): Status: ACTIVE | Noted: 2018-05-18

## 2022-03-19 PROBLEM — U07.1 COVID-19 VIRUS INFECTION: Status: ACTIVE | Noted: 2020-11-30

## 2022-03-19 PROBLEM — Z00.00 PREVENTATIVE HEALTH CARE: Status: ACTIVE | Noted: 2017-06-02

## 2022-06-23 RX ORDER — SIMVASTATIN 20 MG/1
TABLET, FILM COATED ORAL
Qty: 90 TABLET | Refills: 4 | Status: SHIPPED | OUTPATIENT
Start: 2022-06-23

## 2022-06-23 RX ORDER — INSULIN LISPRO 100 [IU]/ML
INJECTION, SUSPENSION SUBCUTANEOUS
Qty: 45 ML | Refills: 11 | Status: SHIPPED | OUTPATIENT
Start: 2022-06-23 | End: 2022-07-07 | Stop reason: SDUPTHER

## 2022-07-07 RX ORDER — INSULIN LISPRO 100 [IU]/ML
45 INJECTION, SUSPENSION SUBCUTANEOUS
Qty: 30 ADJUSTABLE DOSE PRE-FILLED PEN SYRINGE | Refills: 3 | Status: SHIPPED | OUTPATIENT
Start: 2022-07-07

## 2022-08-02 PROBLEM — E88.81 INSULIN RESISTANCE: Status: ACTIVE | Noted: 2022-08-02

## 2022-08-02 PROBLEM — E88.819 INSULIN RESISTANCE: Status: ACTIVE | Noted: 2022-08-02

## 2022-08-17 RX ORDER — INSULIN GLARGINE 100 [IU]/ML
80 INJECTION, SOLUTION SUBCUTANEOUS
Qty: 70 ML | Refills: 11 | Status: SHIPPED | OUTPATIENT
Start: 2022-08-17

## 2022-11-16 RX ORDER — SYRING-NEEDL,DISP,INSUL,0.3 ML 31GX15/64"
SYRINGE, EMPTY DISPOSABLE MISCELLANEOUS
Qty: 100 PEN NEEDLE | Refills: 11 | Status: SHIPPED | OUTPATIENT
Start: 2022-11-16

## 2022-11-16 RX ORDER — BLOOD SUGAR DIAGNOSTIC
STRIP MISCELLANEOUS
Qty: 100 STRIP | Refills: 11 | Status: SHIPPED | OUTPATIENT
Start: 2022-11-16

## 2022-12-14 RX ORDER — METFORMIN HYDROCHLORIDE 500 MG/1
TABLET, EXTENDED RELEASE ORAL
Qty: 180 TABLET | Refills: 7 | Status: SHIPPED | OUTPATIENT
Start: 2022-12-14

## 2023-02-15 RX ORDER — VALSARTAN AND HYDROCHLOROTHIAZIDE 320; 25 MG/1; MG/1
TABLET, FILM COATED ORAL
Qty: 90 TABLET | Refills: 3 | Status: SHIPPED | OUTPATIENT
Start: 2023-02-15

## 2023-03-03 ENCOUNTER — OFFICE VISIT (OUTPATIENT)
Dept: INTERNAL MEDICINE CLINIC | Age: 63
End: 2023-03-03

## 2023-03-03 VITALS
BODY MASS INDEX: 36.41 KG/M2 | WEIGHT: 260.1 LBS | OXYGEN SATURATION: 92 % | TEMPERATURE: 97.7 F | RESPIRATION RATE: 20 BRPM | DIASTOLIC BLOOD PRESSURE: 84 MMHG | HEIGHT: 71 IN | HEART RATE: 84 BPM | SYSTOLIC BLOOD PRESSURE: 140 MMHG

## 2023-03-03 DIAGNOSIS — R35.1 NOCTURIA: ICD-10-CM

## 2023-03-03 DIAGNOSIS — E56.9 VITAMIN DEFICIENCY: ICD-10-CM

## 2023-03-03 DIAGNOSIS — K51.90 ULCERATIVE COLITIS WITHOUT COMPLICATIONS, UNSPECIFIED LOCATION (HCC): ICD-10-CM

## 2023-03-03 DIAGNOSIS — I10 PRIMARY HYPERTENSION: ICD-10-CM

## 2023-03-03 DIAGNOSIS — Z79.4 TYPE 2 DIABETES MELLITUS WITHOUT COMPLICATION, WITH LONG-TERM CURRENT USE OF INSULIN (HCC): Primary | ICD-10-CM

## 2023-03-03 DIAGNOSIS — E11.9 TYPE 2 DIABETES MELLITUS WITHOUT COMPLICATION, WITH LONG-TERM CURRENT USE OF INSULIN (HCC): Primary | ICD-10-CM

## 2023-03-03 DIAGNOSIS — E78.5 DYSLIPIDEMIA: ICD-10-CM

## 2023-03-03 RX ORDER — TIRZEPATIDE 2.5 MG/.5ML
2.5 INJECTION, SOLUTION SUBCUTANEOUS
Qty: 4 ADJUSTABLE DOSE PRE-FILLED PEN SYRINGE | Refills: 0 | Status: SHIPPED | OUTPATIENT
Start: 2023-03-03

## 2023-03-03 NOTE — PROGRESS NOTES
Chief Complaint   Patient presents with    Cholesterol Problem    Diabetes    Hypertension     YES Answers must have Comments  1. \"Have you been to the ER, urgent care clinic since your last visit? Hospitalized since your last visit? \"    [] YES   [x] NO       2. Have you seen or consulted any other health care providers outside of [address] [place] [address] since your last visit?     [] YES   [x] NO       3. For patients aged 39-70: Have you had a colorectal cancer screening such as a colonoscopy/FIT/Cologuard? Nurse/CMA to request records if not in chart   [x] YES When 1 year ago, Where [facility], and Type? Colonoscopy  [] NO   [] NA, based on age    If the patient is female:      4. For female patients aged 41-77: Carmencita Baez you had a mammogram in the last two years?  Nurse/CMA to request records if not in chart   [] YES   [] NO   [] NA, based on age    11. For female patients aged 21-65: Carmencita Baez you had a pap smear?   Nurse/CMA to request records if not in chart   [] YES   [] NO  [] NA, based on age

## 2023-03-03 NOTE — PROGRESS NOTES
[facility]  Aditya Moss MD, [address] [address]  Phone:  [phone]  Fax: [phone]       Chief Complaint   Patient presents with    Cholesterol Problem    Diabetes    Hypertension   . SUBJECTIVE:    Quyen Dumont is a 57 y.o. male Comes in today with a known history of hypertension, diabetes mellitus type 2, dyslipidemia, ulcerative colitis and is seen for evaluation. Interestingly, he has graduated from morbid obesity to just obesity, so we congratulate him. Patient is seen for evaluation. Current Outpatient Medications   Medication Sig Dispense Refill    tirzepatide (Mounjaro) 2.5 mg/0.5 mL pnij 2.5 mg by SubCUTAneous route every seven (7) days. 4 Adjustable Dose Pre-filled Pen Syringe 0    valsartan-hydroCHLOROthiazide (DIOVAN-HCT) 320-25 mg per tablet TAKE ONE TABLET BY MOUTH DAILY 90 Tablet 3    metFORMIN ER (GLUCOPHAGE XR) 500 mg tablet TAKE ONE TABLET BY MOUTH TWICE A  Tablet 7    OneTouch Ultra Test strip USE TO TEST BLOOD SUGAR FOUR TIMES A  Strip 11    BD Veo Insulin Syringe UF 1 mL 31 gauge x 15/64\" syrg INJECT ONE UNIT UNDER THE SKIN TWO TIMES A  Pen Needle 11    insulin lispro protamin-lispro (HUMALOG 75-25 MIX) flexpen INJECT 45 UNITS UNDER THE SKIN BEFORE BREAKFAST AND DINNER 30 mL 11    insulin glargine (Lantus U-100 Insulin) 100 unit/mL injection 80 Units by SubCUTAneous route nightly.  70 mL 11    simvastatin (ZOCOR) 20 mg tablet TAKE ONE TABLET BY MOUTH DAILY 90 Tablet 4    Insulin Syringe-Needle U-100 1 mL 31 gauge x 5/16 syrg USE WITH LANTUS INSULIN DAILY 100 Syringe 11    Insulin Needles, Disposable, (ESTEPHANIA PEN NEEDLE) 32 gauge x 5/32\" ndle USE TO INJECT INSULIN EVERY NIGHT 100 Pen Needle 11    telmisartan-hydroCHLOROthiazide (MICARDIS HCT) 80-12.5 mg per tablet TAKE ONE TABLET BY MOUTH DAILY (Patient not taking: Reported on 3/3/2023) 90 Tablet 3    mesalamine EC (ASACOL) 400 mg EC tablet Take  by mouth three (3) times daily. (Patient not taking: Reported on 3/3/2023)       Past Medical History:   Diagnosis Date    Diabetes ([facility] [address]     Dyslipidemia     Hypertension     Insulin resistance     Obesity     Preventative health care 06/02/2017    Preventative health care     S/P colonoscopy 2016    S/P colonoscopy 02/22/2022    Rosaura Sibley MD 5 yrs    Ulcerative colitis ([facility] [address] 01/15/2015    md melodie +     Past Surgical History:   Procedure Laterality Date    COLONOSCOPY N/A 2/22/2022    COLONOSCOPY performed by Rosaura Sibley MD at [facility] COLONOSCOPY       No Known Allergies      REVIEW OF SYSTEMS:  General: negative for - chills or fever  ENT: negative for - headaches, nasal congestion or tinnitus  Respiratory: negative for - cough, hemoptysis, shortness of breath or wheezing  Cardiovascular : negative for - chest pain, edema, palpitations or shortness of breath  Gastrointestinal: negative for - abdominal pain, blood in stools, heartburn or nausea/vomiting  Genito-Urinary: no dysuria, trouble voiding, or hematuria  Musculoskeletal: negative for - gait disturbance, joint pain, joint stiffness or joint swelling  Neurological: no TIA or stroke symptoms  Hematologic: no bruises, no bleeding, no swollen glands  Integument: no lumps, mole changes, nail changes or rash  Endocrine: no malaise/lethargy or unexpected weight changes      Social History     Socioeconomic History    Marital status:    Tobacco Use    Smoking status: Never    Smokeless tobacco: Never   Vaping Use    Vaping Use: Never used   Substance and Sexual Activity    Alcohol use:  Yes     Alcohol/week: 1.0 standard drink     Types: 1 Glasses of wine per week     Comment: occasional    Drug use: No    Sexual activity: Yes     Partners: Female     Birth control/protection: None   Social History Narrative    Medical History: Diabetes mellitus type 2Primary hypertensionDyslipidemiaObesityher a 17 2006 chest CT negative for tumor Surgical History: colonoscopy and polypectomy 2009    Hospitalization/Major Diagnostic Procedure: No Hospitalization History. Family History: Mother:  54 yrs transitional cell carcinoma hypertension diabetes Father:  68 yrs Stage renal disease    hypertension diabetes CLL TIA Sister(s): alive Brother(s): alive Diabetes, hypertension polyps Daughter(s): alive 25 yrs w /m fall Son(s): alive 24    Yrs investment banking - hedge[employer] in [place]  2 brother(s) , 1 sister(s) . 1 son [employer] , 1 daughter(s) . 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 Smoking Status Patient is a current some day smoker, Received cessation counseling on: 2013. Marital Status: . Lives w ith: spouse. Occupation/W ork: employed full time nephologist. Education/School: has highschool diploma, has college diploma md.     Family History   Problem Relation Age of Onset    Cancer Mother        OBJECTIVE:    Visit Vitals  BP (!) 140/84 (BP 1 Location: Left upper arm, BP Patient Position: Sitting)   Pulse 84   Temp 97.7 °F (36.5 °C) (Oral)   Resp 20   Ht 5' 11\" (1.803 m)   Wt 260 lb 1.6 oz (118 kg)   SpO2 92%   BMI 36.28 kg/m²     CONSTITUTIONAL: well , well nourished, appears age appropriate  EYES: perrla, eom intact  ENMT:moist mucous membranes, pharynx clear  NECK: supple. Thyroid normal  RESPIRATORY: Chest: clear bilaterally   CARDIOVASCULAR: Heart: regular rate and rhythm  GASTROINTESTINAL: Abdomen: soft, bowel sounds active  HEMATOLOGIC: no pathological lymph nodes palpated  MUSCULOSKELETAL: Extremities: no edema, pulse 1+   INTEGUMENT: No unusual rashes or suspicious skin lesions noted. Nails appear normal.  NEUROLOGIC: non-focal exam   MENTAL STATUS: alert and oriented, appropriate affect           ASSESSMENT:  1.  Type 2 diabetes mellitus without complication, with long-term current use of insulin ([facility] [address]    2. Primary hypertension    3. Dyslipidemia    4. Ulcerative colitis without complications, unspecified location ([facility] [address]    5. Nocturia    6. Vitamin deficiency      Patient's medical progress is satisfactory. We are concerned about his weight and we encouraged physical activity 30 minutes five days a week and a heart healthy, diabetic, weight reducing diet. He is hoping that the new GLP1 will help him with weight reduction. He has type 2 diabetes and we will check hemoglobin A1c, as well as his urine, for control. Blood pressure control is a little higher than I would like to see it. At home it is in the 130s. We suggest it be in the 120s and we have a discussion regarding that. Dyslipidemia, for which he is on a statin. Ulcerative colitis is followed by Dr. Brenda Khan. He will be back to see me in one year. We communicate on a regular basis every three months or so regarding his health. I have discussed the diagnosis with the patient and the intended plan as seen in the  Orders. The patient understands and agees with the plan. The patient has   received an after visit summary and questions were answered concerning  future plans  Patient labs and/or xrays were reviewed  Past records were reviewed. PLAN:  .  Orders Placed This Encounter    URINALYSIS W/ RFLX MICROSCOPIC    CBC WITH AUTOMATED DIFF    METABOLIC PANEL, COMPREHENSIVE    LIPID PANEL    PROSTATE SPECIFIC AG    HEMOGLOBIN A1C WITH EAG    MICROALBUMIN, UR, RAND W/ MICROALB/CREAT RATIO    PROTEIN/CREATININE RATIO, URINE    VITAMIN D, 25 HYDROXY    tirzepatide (Mounjaro) 2.5 mg/0.5 mL pnij       Follow-up and Dispositions    Return in about 1 year (around 3/3/2024). ATTENTION:   This medical record was transcribed using an electronic medical records system. Although proofread, it may and can contain electronic and spelling errors. Other human spelling and other errors may be present. Corrections may be executed at a later time. Please feel free to contact us for any clarifications as needed.

## 2023-03-04 LAB
25(OH)D3+25(OH)D2 SERPL-MCNC: 24.7 NG/ML (ref 30–100)
ALBUMIN SERPL-MCNC: 4.6 G/DL (ref 3.8–4.8)
ALBUMIN/CREAT UR: 114 MG/G CREAT (ref 0–29)
ALBUMIN/GLOB SERPL: 1.7 {RATIO} (ref 1.2–2.2)
ALP SERPL-CCNC: 58 IU/L (ref 44–121)
ALT SERPL-CCNC: 18 IU/L (ref 0–44)
APPEARANCE UR: CLEAR
AST SERPL-CCNC: 18 IU/L (ref 0–40)
BASOPHILS # BLD AUTO: 0.1 X10E3/UL (ref 0–0.2)
BASOPHILS NFR BLD AUTO: 1 %
BILIRUB SERPL-MCNC: 0.4 MG/DL (ref 0–1.2)
BILIRUB UR QL STRIP: NEGATIVE
BUN SERPL-MCNC: 10 MG/DL (ref 8–27)
BUN/CREAT SERPL: 11 (ref 10–24)
CALCIUM SERPL-MCNC: 9.8 MG/DL (ref 8.6–10.2)
CHLORIDE SERPL-SCNC: 102 MMOL/L (ref 96–106)
CHOLEST SERPL-MCNC: 118 MG/DL (ref 100–199)
CO2 SERPL-SCNC: 23 MMOL/L (ref 20–29)
COLOR UR: YELLOW
CREAT SERPL-MCNC: 0.91 MG/DL (ref 0.76–1.27)
CREAT UR-MCNC: 97.5 MG/DL
EGFRCR SERPLBLD CKD-EPI 2021: 94 ML/MIN/1.73
EOSINOPHIL # BLD AUTO: 0.1 X10E3/UL (ref 0–0.4)
EOSINOPHIL NFR BLD AUTO: 2 %
ERYTHROCYTE [DISTWIDTH] IN BLOOD BY AUTOMATED COUNT: 13.8 % (ref 11.6–15.4)
EST. AVERAGE GLUCOSE BLD GHB EST-MCNC: 186 MG/DL
GLOBULIN SER CALC-MCNC: 2.7 G/DL (ref 1.5–4.5)
GLUCOSE SERPL-MCNC: 108 MG/DL (ref 70–99)
GLUCOSE UR QL STRIP: NEGATIVE
HBA1C MFR BLD: 8.1 % (ref 4.8–5.6)
HCT VFR BLD AUTO: 48.4 % (ref 37.5–51)
HDLC SERPL-MCNC: 38 MG/DL
HGB BLD-MCNC: 15.7 G/DL (ref 13–17.7)
HGB UR QL STRIP: NEGATIVE
IMM GRANULOCYTES # BLD AUTO: 0 X10E3/UL (ref 0–0.1)
IMM GRANULOCYTES NFR BLD AUTO: 0 %
KETONES UR QL STRIP: NEGATIVE
LDLC SERPL CALC-MCNC: 63 MG/DL (ref 0–99)
LEUKOCYTE ESTERASE UR QL STRIP: NEGATIVE
LYMPHOCYTES # BLD AUTO: 2.3 X10E3/UL (ref 0.7–3.1)
LYMPHOCYTES NFR BLD AUTO: 27 %
MCH RBC QN AUTO: 28.2 PG (ref 26.6–33)
MCHC RBC AUTO-ENTMCNC: 32.4 G/DL (ref 31.5–35.7)
MCV RBC AUTO: 87 FL (ref 79–97)
MICRO URNS: NORMAL
MICROALBUMIN UR-MCNC: 111.6 UG/ML
MONOCYTES # BLD AUTO: 0.6 X10E3/UL (ref 0.1–0.9)
MONOCYTES NFR BLD AUTO: 7 %
NEUTROPHILS # BLD AUTO: 5.3 X10E3/UL (ref 1.4–7)
NEUTROPHILS NFR BLD AUTO: 63 %
NITRITE UR QL STRIP: NEGATIVE
PH UR STRIP: 5.5 [PH] (ref 5–7.5)
PLATELET # BLD AUTO: 286 X10E3/UL (ref 150–450)
POTASSIUM SERPL-SCNC: 3.8 MMOL/L (ref 3.5–5.2)
PROT SERPL-MCNC: 7.3 G/DL (ref 6–8.5)
PROT UR QL STRIP: NORMAL
PROT UR-MCNC: 24.6 MG/DL
PROT/CREAT UR: 252 MG/G CREAT (ref 0–200)
PSA SERPL-MCNC: 0.9 NG/ML (ref 0–4)
RBC # BLD AUTO: 5.56 X10E6/UL (ref 4.14–5.8)
SODIUM SERPL-SCNC: 142 MMOL/L (ref 134–144)
SP GR UR STRIP: 1.02 (ref 1–1.03)
TRIGL SERPL-MCNC: 88 MG/DL (ref 0–149)
UROBILINOGEN UR STRIP-MCNC: 0.2 MG/DL (ref 0.2–1)
VLDLC SERPL CALC-MCNC: 17 MG/DL (ref 5–40)
WBC # BLD AUTO: 8.3 X10E3/UL (ref 3.4–10.8)

## 2023-03-05 NOTE — PROGRESS NOTES
Labs are acceptable with the exception of your hemoglobin A1c.   Please adjust your insulin diet accordingly

## 2023-06-27 RX ORDER — SIMVASTATIN 20 MG
TABLET ORAL
Qty: 90 TABLET | Refills: 3 | Status: SHIPPED | OUTPATIENT
Start: 2023-06-27

## 2023-09-20 RX ORDER — SEMAGLUTIDE 1.34 MG/ML
0.25 INJECTION, SOLUTION SUBCUTANEOUS
Qty: 4 ADJUSTABLE DOSE PRE-FILLED PEN SYRINGE | Refills: 0 | Status: SHIPPED | OUTPATIENT
Start: 2023-09-20

## 2023-10-26 RX ORDER — INSULIN GLARGINE 100 [IU]/ML
INJECTION, SOLUTION SUBCUTANEOUS
Qty: 70 ML | Refills: 11 | Status: SHIPPED | OUTPATIENT
Start: 2023-10-26

## 2023-10-28 RX ORDER — SEMAGLUTIDE 0.68 MG/ML
INJECTION, SOLUTION SUBCUTANEOUS
Qty: 3 ML | OUTPATIENT
Start: 2023-10-28

## 2023-11-07 RX ORDER — INSULIN LISPRO 100 [IU]/ML
INJECTION, SUSPENSION SUBCUTANEOUS
Qty: 75 ML | Refills: 11 | Status: SHIPPED | OUTPATIENT
Start: 2023-11-07

## 2024-01-22 RX ORDER — SYRING-NEEDL,DISP,INSUL,0.3 ML 31GX15/64"
SYRINGE, EMPTY DISPOSABLE MISCELLANEOUS
Qty: 100 EACH | Refills: 11 | Status: SHIPPED | OUTPATIENT
Start: 2024-01-22

## 2024-01-25 RX ORDER — VALSARTAN AND HYDROCHLOROTHIAZIDE 320; 25 MG/1; MG/1
TABLET, FILM COATED ORAL
Qty: 90 TABLET | Refills: 3 | Status: SHIPPED | OUTPATIENT
Start: 2024-01-25

## 2024-01-25 RX ORDER — BLOOD SUGAR DIAGNOSTIC
STRIP MISCELLANEOUS
Qty: 200 STRIP | Refills: 11 | Status: SHIPPED | OUTPATIENT
Start: 2024-01-25

## 2024-03-09 RX ORDER — METFORMIN HYDROCHLORIDE 500 MG/1
TABLET, EXTENDED RELEASE ORAL
Qty: 180 TABLET | Refills: 3 | Status: SHIPPED | OUTPATIENT
Start: 2024-03-09

## 2024-04-01 ENCOUNTER — OFFICE VISIT (OUTPATIENT)
Facility: CLINIC | Age: 64
End: 2024-04-01
Payer: COMMERCIAL

## 2024-04-01 VITALS
TEMPERATURE: 97.1 F | HEIGHT: 71 IN | WEIGHT: 251 LBS | DIASTOLIC BLOOD PRESSURE: 69 MMHG | HEART RATE: 90 BPM | SYSTOLIC BLOOD PRESSURE: 117 MMHG | BODY MASS INDEX: 35.14 KG/M2 | OXYGEN SATURATION: 97 %

## 2024-04-01 DIAGNOSIS — Z79.4 TYPE 2 DIABETES MELLITUS WITHOUT COMPLICATION, WITH LONG-TERM CURRENT USE OF INSULIN (HCC): ICD-10-CM

## 2024-04-01 DIAGNOSIS — Z00.00 PREVENTATIVE HEALTH CARE: Primary | ICD-10-CM

## 2024-04-01 DIAGNOSIS — E11.9 TYPE 2 DIABETES MELLITUS WITHOUT COMPLICATION, WITH LONG-TERM CURRENT USE OF INSULIN (HCC): ICD-10-CM

## 2024-04-01 DIAGNOSIS — I10 PRIMARY HYPERTENSION: ICD-10-CM

## 2024-04-01 DIAGNOSIS — E78.5 DYSLIPIDEMIA: ICD-10-CM

## 2024-04-01 DIAGNOSIS — K51.919 ULCERATIVE COLITIS WITH COMPLICATION, UNSPECIFIED LOCATION (HCC): ICD-10-CM

## 2024-04-01 PROCEDURE — 3074F SYST BP LT 130 MM HG: CPT | Performed by: INTERNAL MEDICINE

## 2024-04-01 PROCEDURE — 3078F DIAST BP <80 MM HG: CPT | Performed by: INTERNAL MEDICINE

## 2024-04-01 PROCEDURE — 36415 COLL VENOUS BLD VENIPUNCTURE: CPT | Performed by: INTERNAL MEDICINE

## 2024-04-01 PROCEDURE — G0402 INITIAL PREVENTIVE EXAM: HCPCS | Performed by: INTERNAL MEDICINE

## 2024-04-01 PROCEDURE — 1123F ACP DISCUSS/DSCN MKR DOCD: CPT | Performed by: INTERNAL MEDICINE

## 2024-04-01 RX ORDER — TADALAFIL 2.5 MG/1
2.5 TABLET ORAL DAILY
Qty: 90 TABLET | Refills: 3 | Status: SHIPPED | OUTPATIENT
Start: 2024-04-01

## 2024-04-01 RX ORDER — TAMSULOSIN HYDROCHLORIDE 0.4 MG/1
0.4 CAPSULE ORAL DAILY
Qty: 90 CAPSULE | Refills: 3 | Status: SHIPPED | OUTPATIENT
Start: 2024-04-01 | End: 2024-04-01

## 2024-04-01 SDOH — ECONOMIC STABILITY: FOOD INSECURITY: WITHIN THE PAST 12 MONTHS, THE FOOD YOU BOUGHT JUST DIDN'T LAST AND YOU DIDN'T HAVE MONEY TO GET MORE.: NEVER TRUE

## 2024-04-01 SDOH — ECONOMIC STABILITY: INCOME INSECURITY: HOW HARD IS IT FOR YOU TO PAY FOR THE VERY BASICS LIKE FOOD, HOUSING, MEDICAL CARE, AND HEATING?: NOT HARD AT ALL

## 2024-04-01 SDOH — ECONOMIC STABILITY: HOUSING INSECURITY
IN THE LAST 12 MONTHS, WAS THERE A TIME WHEN YOU DID NOT HAVE A STEADY PLACE TO SLEEP OR SLEPT IN A SHELTER (INCLUDING NOW)?: NO

## 2024-04-01 SDOH — ECONOMIC STABILITY: FOOD INSECURITY: WITHIN THE PAST 12 MONTHS, YOU WORRIED THAT YOUR FOOD WOULD RUN OUT BEFORE YOU GOT MONEY TO BUY MORE.: NEVER TRUE

## 2024-04-01 ASSESSMENT — PATIENT HEALTH QUESTIONNAIRE - PHQ9
SUM OF ALL RESPONSES TO PHQ QUESTIONS 1-9: 0
SUM OF ALL RESPONSES TO PHQ9 QUESTIONS 1 & 2: 0
1. LITTLE INTEREST OR PLEASURE IN DOING THINGS: NOT AT ALL
SUM OF ALL RESPONSES TO PHQ QUESTIONS 1-9: 0
2. FEELING DOWN, DEPRESSED OR HOPELESS: NOT AT ALL

## 2024-04-01 NOTE — PROGRESS NOTES
Chief Complaint   Patient presents with    Diabetes     \"Have you been to the ER, urgent care clinic since your last visit?  Hospitalized since your last visit?\"    NO    “Have you seen or consulted any other health care providers outside of [facility] since your last visit?”    NO            Click Here for Release of Records Request  
4/1/2025    CBC with Auto Differential     Standing Status:   Future     Number of Occurrences:   1     Standing Expiration Date:   4/1/2025    Hepatitis B Surface Antibody     Standing Status:   Future     Number of Occurrences:   1     Standing Expiration Date:   4/1/2025    Hepatitis B Surface Antigen     Standing Status:   Future     Number of Occurrences:   1     Standing Expiration Date:   4/1/2025    Hepatitis B Core Antibody, Total     Standing Status:   Future     Number of Occurrences:   1     Standing Expiration Date:   4/1/2025    Hepatitis C Antibody     Standing Status:   Future     Number of Occurrences:   1     Standing Expiration Date:   4/1/2025    HIV 1/2 Ag/Ab, 4TH Generation,W Rflx Confirm     Standing Status:   Future     Number of Occurrences:   1     Standing Expiration Date:   4/1/2025    Microalbumin / Creatinine Urine Ratio     Standing Status:   Future     Number of Occurrences:   1     Standing Expiration Date:   4/1/2025    Hemoglobin A1C     Standing Status:   Future     Number of Occurrences:   1     Standing Expiration Date:   4/1/2025    Protein / creatinine ratio, urine     Standing Status:   Future     Number of Occurrences:   1     Standing Expiration Date:   4/1/2025    TN COLLECTION VENOUS BLOOD VENIPUNCTURE        Follow-up and Dispositions    Return in about 1 year (around 4/1/2025).                ATTENTION:   This medical record was transcribed using an electronic medical records system.  Although proofread, it may and can contain electronic and spelling errors.  Other human spelling and other errors may be present.  Corrections may be executed at a later time.  Please feel free to contact us for any clarifications as needed.

## 2024-04-02 LAB
ALBUMIN SERPL-MCNC: 4.4 G/DL (ref 3.9–4.9)
ALBUMIN/CREAT UR: 105 MG/G CREAT (ref 0–29)
ALBUMIN/GLOB SERPL: 1.6 {RATIO} (ref 1.2–2.2)
ALP SERPL-CCNC: 58 IU/L (ref 44–121)
ALT SERPL-CCNC: 25 IU/L (ref 0–44)
APPEARANCE UR: CLEAR
AST SERPL-CCNC: 31 IU/L (ref 0–40)
BACTERIA #/AREA URNS HPF: NORMAL /[HPF]
BASOPHILS # BLD AUTO: 0 X10E3/UL (ref 0–0.2)
BASOPHILS NFR BLD AUTO: 0 %
BILIRUB SERPL-MCNC: 0.5 MG/DL (ref 0–1.2)
BILIRUB UR QL STRIP: NEGATIVE
BUN SERPL-MCNC: 9 MG/DL (ref 8–27)
BUN/CREAT SERPL: 10 (ref 10–24)
CALCIUM SERPL-MCNC: 9.5 MG/DL (ref 8.6–10.2)
CASTS URNS QL MICRO: NORMAL /LPF
CHLORIDE SERPL-SCNC: 101 MMOL/L (ref 96–106)
CHOLEST SERPL-MCNC: 138 MG/DL (ref 100–199)
CO2 SERPL-SCNC: 23 MMOL/L (ref 20–29)
COLOR UR: YELLOW
CREAT SERPL-MCNC: 0.88 MG/DL (ref 0.76–1.27)
CREAT UR-MCNC: 109.7 MG/DL
EGFRCR SERPLBLD CKD-EPI 2021: 95 ML/MIN/1.73
EOSINOPHIL # BLD AUTO: 0.1 X10E3/UL (ref 0–0.4)
EOSINOPHIL NFR BLD AUTO: 2 %
EPI CELLS #/AREA URNS HPF: NORMAL /HPF (ref 0–10)
ERYTHROCYTE [DISTWIDTH] IN BLOOD BY AUTOMATED COUNT: 14.1 % (ref 11.6–15.4)
GLOBULIN SER CALC-MCNC: 2.8 G/DL (ref 1.5–4.5)
GLUCOSE SERPL-MCNC: 96 MG/DL (ref 70–99)
GLUCOSE UR QL STRIP: NEGATIVE
HBA1C MFR BLD: 9 % (ref 4.8–5.6)
HBV CORE AB SERPL QL IA: NEGATIVE
HBV SURFACE AB SER QL: NON REACTIVE
HBV SURFACE AG SERPL QL IA: NEGATIVE
HCT VFR BLD AUTO: 50.3 % (ref 37.5–51)
HCV IGG SERPL QL IA: NON REACTIVE
HDLC SERPL-MCNC: 40 MG/DL
HGB BLD-MCNC: 16.2 G/DL (ref 13–17.7)
HGB UR QL STRIP: NEGATIVE
HIV 1+2 AB+HIV1 P24 AG SERPL QL IA: NON REACTIVE
IMM GRANULOCYTES # BLD AUTO: 0 X10E3/UL (ref 0–0.1)
IMM GRANULOCYTES NFR BLD AUTO: 0 %
KETONES UR QL STRIP: NEGATIVE
LDLC SERPL CALC-MCNC: 78 MG/DL (ref 0–99)
LEUKOCYTE ESTERASE UR QL STRIP: NEGATIVE
LYMPHOCYTES # BLD AUTO: 2.3 X10E3/UL (ref 0.7–3.1)
LYMPHOCYTES NFR BLD AUTO: 25 %
MCH RBC QN AUTO: 28.4 PG (ref 26.6–33)
MCHC RBC AUTO-ENTMCNC: 32.2 G/DL (ref 31.5–35.7)
MCV RBC AUTO: 88 FL (ref 79–97)
MICRO URNS: NORMAL
MICRO URNS: NORMAL
MICROALBUMIN UR-MCNC: 115.4 UG/ML
MONOCYTES # BLD AUTO: 0.7 X10E3/UL (ref 0.1–0.9)
MONOCYTES NFR BLD AUTO: 7 %
NEUTROPHILS # BLD AUTO: 6.1 X10E3/UL (ref 1.4–7)
NEUTROPHILS NFR BLD AUTO: 66 %
NITRITE UR QL STRIP: NEGATIVE
PH UR STRIP: 5.5 [PH] (ref 5–7.5)
PLATELET # BLD AUTO: 288 X10E3/UL (ref 150–450)
POTASSIUM SERPL-SCNC: 3.9 MMOL/L (ref 3.5–5.2)
PROT SERPL-MCNC: 7.2 G/DL (ref 6–8.5)
PROT UR QL STRIP: NORMAL
PROT UR-MCNC: 26.5 MG/DL
PROT/CREAT UR: 242 MG/G CREAT (ref 0–200)
PSA SERPL-MCNC: 1 NG/ML (ref 0–4)
RBC # BLD AUTO: 5.71 X10E6/UL (ref 4.14–5.8)
RBC #/AREA URNS HPF: NORMAL /HPF (ref 0–2)
SODIUM SERPL-SCNC: 140 MMOL/L (ref 134–144)
SP GR UR STRIP: 1.02 (ref 1–1.03)
TRIGL SERPL-MCNC: 110 MG/DL (ref 0–149)
UROBILINOGEN UR STRIP-MCNC: 0.2 MG/DL (ref 0.2–1)
VLDLC SERPL CALC-MCNC: 20 MG/DL (ref 5–40)
WBC # BLD AUTO: 9.3 X10E3/UL (ref 3.4–10.8)
WBC #/AREA URNS HPF: NORMAL /HPF (ref 0–5)

## 2024-04-04 ENCOUNTER — CLINICAL DOCUMENTATION (OUTPATIENT)
Facility: CLINIC | Age: 64
End: 2024-04-04

## 2024-06-20 RX ORDER — SEMAGLUTIDE 2.68 MG/ML
INJECTION, SOLUTION SUBCUTANEOUS
Qty: 9 ML | Refills: 11 | Status: SHIPPED | OUTPATIENT
Start: 2024-06-20

## 2024-07-12 RX ORDER — SIMVASTATIN 20 MG
20 TABLET ORAL DAILY
Qty: 90 TABLET | Refills: 3 | Status: SHIPPED | OUTPATIENT
Start: 2024-07-12

## 2024-12-21 RX ORDER — INSULIN LISPRO 100 [IU]/ML
INJECTION, SUSPENSION SUBCUTANEOUS
Qty: 75 ML | Refills: 11 | Status: SHIPPED | OUTPATIENT
Start: 2024-12-21

## 2025-01-03 RX ORDER — INSULIN GLARGINE 100 [IU]/ML
INJECTION, SOLUTION SUBCUTANEOUS
Qty: 70 ML | Refills: 11 | Status: SHIPPED | OUTPATIENT
Start: 2025-01-03

## 2025-01-20 RX ORDER — VALSARTAN AND HYDROCHLOROTHIAZIDE 320; 25 MG/1; MG/1
1 TABLET, FILM COATED ORAL DAILY
Qty: 90 TABLET | Refills: 3 | Status: SHIPPED | OUTPATIENT
Start: 2025-01-20

## 2025-03-02 RX ORDER — METFORMIN HYDROCHLORIDE 500 MG/1
500 TABLET, EXTENDED RELEASE ORAL 2 TIMES DAILY
Qty: 180 TABLET | Refills: 3 | Status: SHIPPED | OUTPATIENT
Start: 2025-03-02

## 2025-03-11 ENCOUNTER — PATIENT MESSAGE (OUTPATIENT)
Facility: CLINIC | Age: 65
End: 2025-03-11

## 2025-03-11 DIAGNOSIS — E11.9 TYPE 2 DIABETES MELLITUS WITHOUT COMPLICATION, WITH LONG-TERM CURRENT USE OF INSULIN (HCC): Primary | ICD-10-CM

## 2025-03-11 DIAGNOSIS — R35.1 NOCTURIA: ICD-10-CM

## 2025-03-11 DIAGNOSIS — Z79.4 TYPE 2 DIABETES MELLITUS WITHOUT COMPLICATION, WITH LONG-TERM CURRENT USE OF INSULIN (HCC): Primary | ICD-10-CM

## 2025-03-11 DIAGNOSIS — E78.5 DYSLIPIDEMIA: ICD-10-CM

## 2025-03-11 RX ORDER — INSULIN DEGLUDEC 100 U/ML
64 INJECTION, SOLUTION SUBCUTANEOUS DAILY
Qty: 20 ML | Refills: 11 | Status: SHIPPED | OUTPATIENT
Start: 2025-03-11

## 2025-03-13 RX ORDER — SYRINGE AND NEEDLE,INSULIN,1ML 31GX15/64"
SYRINGE, EMPTY DISPOSABLE MISCELLANEOUS
Qty: 100 EACH | Refills: 11 | Status: SHIPPED | OUTPATIENT
Start: 2025-03-13

## 2025-03-13 RX ORDER — BLOOD SUGAR DIAGNOSTIC
STRIP MISCELLANEOUS
Qty: 200 STRIP | Refills: 11 | Status: SHIPPED | OUTPATIENT
Start: 2025-03-13

## 2025-03-23 RX ORDER — TAMSULOSIN HYDROCHLORIDE 0.4 MG/1
0.4 CAPSULE ORAL DAILY
Qty: 90 CAPSULE | Refills: 3 | Status: SHIPPED | OUTPATIENT
Start: 2025-03-23

## 2025-03-29 LAB
ALBUMIN SERPL-MCNC: 4.4 G/DL (ref 3.9–4.9)
ALBUMIN/CREAT UR: 54 MG/G CREAT (ref 0–29)
ALP SERPL-CCNC: 58 IU/L (ref 44–121)
ALT SERPL-CCNC: 21 IU/L (ref 0–44)
APPEARANCE UR: CLEAR
AST SERPL-CCNC: 20 IU/L (ref 0–40)
BACTERIA #/AREA URNS HPF: NORMAL /[HPF]
BASOPHILS # BLD AUTO: 0 X10E3/UL (ref 0–0.2)
BASOPHILS NFR BLD AUTO: 0 %
BILIRUB SERPL-MCNC: 0.4 MG/DL (ref 0–1.2)
BILIRUB UR QL STRIP: NEGATIVE
BUN SERPL-MCNC: 12 MG/DL (ref 8–27)
BUN/CREAT SERPL: 13 (ref 10–24)
CALCIUM SERPL-MCNC: 9.4 MG/DL (ref 8.6–10.2)
CASTS URNS QL MICRO: NORMAL /LPF
CHLORIDE SERPL-SCNC: 101 MMOL/L (ref 96–106)
CHOLEST SERPL-MCNC: 119 MG/DL (ref 100–199)
CO2 SERPL-SCNC: 25 MMOL/L (ref 20–29)
COLOR UR: YELLOW
CREAT SERPL-MCNC: 0.95 MG/DL (ref 0.76–1.27)
CREAT UR-MCNC: 102 MG/DL
EGFRCR SERPLBLD CKD-EPI 2021: 88 ML/MIN/1.73
EOSINOPHIL # BLD AUTO: 0.1 X10E3/UL (ref 0–0.4)
EOSINOPHIL NFR BLD AUTO: 1 %
EPI CELLS #/AREA URNS HPF: NORMAL /HPF (ref 0–10)
ERYTHROCYTE [DISTWIDTH] IN BLOOD BY AUTOMATED COUNT: 13.8 % (ref 11.6–15.4)
GLOBULIN SER CALC-MCNC: 2.4 G/DL (ref 1.5–4.5)
GLUCOSE SERPL-MCNC: 126 MG/DL (ref 70–99)
GLUCOSE UR QL STRIP: NEGATIVE
HBA1C MFR BLD: 9.1 % (ref 4.8–5.6)
HCT VFR BLD AUTO: 48.9 % (ref 37.5–51)
HDLC SERPL-MCNC: 39 MG/DL
HGB BLD-MCNC: 15.9 G/DL (ref 13–17.7)
HGB UR QL STRIP: NEGATIVE
IMM GRANULOCYTES # BLD AUTO: 0 X10E3/UL (ref 0–0.1)
IMM GRANULOCYTES NFR BLD AUTO: 0 %
KETONES UR QL STRIP: NEGATIVE
LDLC SERPL CALC-MCNC: 63 MG/DL (ref 0–99)
LEUKOCYTE ESTERASE UR QL STRIP: NEGATIVE
LYMPHOCYTES # BLD AUTO: 2.4 X10E3/UL (ref 0.7–3.1)
LYMPHOCYTES NFR BLD AUTO: 28 %
MCH RBC QN AUTO: 29.1 PG (ref 26.6–33)
MCHC RBC AUTO-ENTMCNC: 32.5 G/DL (ref 31.5–35.7)
MCV RBC AUTO: 90 FL (ref 79–97)
MICRO URNS: NORMAL
MICRO URNS: NORMAL
MICROALBUMIN UR-MCNC: 55.1 UG/ML
MONOCYTES # BLD AUTO: 0.6 X10E3/UL (ref 0.1–0.9)
MONOCYTES NFR BLD AUTO: 7 %
NEUTROPHILS # BLD AUTO: 5.6 X10E3/UL (ref 1.4–7)
NEUTROPHILS NFR BLD AUTO: 64 %
NITRITE UR QL STRIP: NEGATIVE
PH UR STRIP: 6.5 [PH] (ref 5–7.5)
PLATELET # BLD AUTO: 268 X10E3/UL (ref 150–450)
POTASSIUM SERPL-SCNC: 3.9 MMOL/L (ref 3.5–5.2)
PROT SERPL-MCNC: 6.8 G/DL (ref 6–8.5)
PROT UR QL STRIP: NORMAL
PSA SERPL-MCNC: 1 NG/ML (ref 0–4)
RBC # BLD AUTO: 5.46 X10E6/UL (ref 4.14–5.8)
RBC #/AREA URNS HPF: NORMAL /HPF (ref 0–2)
SODIUM SERPL-SCNC: 141 MMOL/L (ref 134–144)
SP GR UR STRIP: 1.02 (ref 1–1.03)
TRIGL SERPL-MCNC: 89 MG/DL (ref 0–149)
UROBILINOGEN UR STRIP-MCNC: 0.2 MG/DL (ref 0.2–1)
VLDLC SERPL CALC-MCNC: 17 MG/DL (ref 5–40)
WBC # BLD AUTO: 8.8 X10E3/UL (ref 3.4–10.8)
WBC #/AREA URNS HPF: NORMAL /HPF (ref 0–5)

## 2025-03-30 ENCOUNTER — RESULTS FOLLOW-UP (OUTPATIENT)
Facility: CLINIC | Age: 65
End: 2025-03-30
Payer: COMMERCIAL

## 2025-03-30 DIAGNOSIS — E11.9 TYPE 2 DIABETES MELLITUS WITHOUT COMPLICATION, WITH LONG-TERM CURRENT USE OF INSULIN: Primary | ICD-10-CM

## 2025-03-30 DIAGNOSIS — Z79.4 TYPE 2 DIABETES MELLITUS WITHOUT COMPLICATION, WITH LONG-TERM CURRENT USE OF INSULIN: Primary | ICD-10-CM

## 2025-03-30 PROCEDURE — 36415 COLL VENOUS BLD VENIPUNCTURE: CPT | Performed by: INTERNAL MEDICINE

## 2025-04-23 SDOH — ECONOMIC STABILITY: TRANSPORTATION INSECURITY
IN THE PAST 12 MONTHS, HAS THE LACK OF TRANSPORTATION KEPT YOU FROM MEDICAL APPOINTMENTS OR FROM GETTING MEDICATIONS?: NO

## 2025-04-23 SDOH — ECONOMIC STABILITY: INCOME INSECURITY: IN THE LAST 12 MONTHS, WAS THERE A TIME WHEN YOU WERE NOT ABLE TO PAY THE MORTGAGE OR RENT ON TIME?: NO

## 2025-04-23 SDOH — ECONOMIC STABILITY: FOOD INSECURITY: WITHIN THE PAST 12 MONTHS, YOU WORRIED THAT YOUR FOOD WOULD RUN OUT BEFORE YOU GOT MONEY TO BUY MORE.: NEVER TRUE

## 2025-04-23 SDOH — ECONOMIC STABILITY: FOOD INSECURITY: WITHIN THE PAST 12 MONTHS, THE FOOD YOU BOUGHT JUST DIDN'T LAST AND YOU DIDN'T HAVE MONEY TO GET MORE.: NEVER TRUE

## 2025-04-23 SDOH — ECONOMIC STABILITY: TRANSPORTATION INSECURITY
IN THE PAST 12 MONTHS, HAS LACK OF TRANSPORTATION KEPT YOU FROM MEETINGS, WORK, OR FROM GETTING THINGS NEEDED FOR DAILY LIVING?: NO

## 2025-04-25 ENCOUNTER — OFFICE VISIT (OUTPATIENT)
Facility: CLINIC | Age: 65
End: 2025-04-25

## 2025-04-25 VITALS
DIASTOLIC BLOOD PRESSURE: 66 MMHG | HEART RATE: 92 BPM | BODY MASS INDEX: 34.89 KG/M2 | WEIGHT: 249.2 LBS | RESPIRATION RATE: 20 BRPM | SYSTOLIC BLOOD PRESSURE: 124 MMHG | HEIGHT: 71 IN | TEMPERATURE: 97.5 F | OXYGEN SATURATION: 95 %

## 2025-04-25 DIAGNOSIS — Z00.00 PREVENTATIVE HEALTH CARE: Primary | ICD-10-CM

## 2025-04-25 DIAGNOSIS — E78.5 DYSLIPIDEMIA: ICD-10-CM

## 2025-04-25 DIAGNOSIS — K51.919 ULCERATIVE COLITIS WITH COMPLICATION, UNSPECIFIED LOCATION (HCC): ICD-10-CM

## 2025-04-25 DIAGNOSIS — E11.9 TYPE 2 DIABETES MELLITUS WITHOUT COMPLICATION, WITH LONG-TERM CURRENT USE OF INSULIN (HCC): ICD-10-CM

## 2025-04-25 DIAGNOSIS — N39.43 BENIGN PROSTATIC HYPERPLASIA WITH POST-VOID DRIBBLING: ICD-10-CM

## 2025-04-25 DIAGNOSIS — I10 PRIMARY HYPERTENSION: ICD-10-CM

## 2025-04-25 DIAGNOSIS — Z79.4 TYPE 2 DIABETES MELLITUS WITHOUT COMPLICATION, WITH LONG-TERM CURRENT USE OF INSULIN (HCC): ICD-10-CM

## 2025-04-25 DIAGNOSIS — N40.1 BENIGN PROSTATIC HYPERPLASIA WITH POST-VOID DRIBBLING: ICD-10-CM

## 2025-04-25 RX ORDER — TADALAFIL 5 MG/1
5 TABLET ORAL DAILY
Qty: 90 TABLET | Refills: 3 | Status: SHIPPED | OUTPATIENT
Start: 2025-04-25

## 2025-04-25 SDOH — ECONOMIC STABILITY: FOOD INSECURITY: WITHIN THE PAST 12 MONTHS, THE FOOD YOU BOUGHT JUST DIDN'T LAST AND YOU DIDN'T HAVE MONEY TO GET MORE.: NEVER TRUE

## 2025-04-25 SDOH — ECONOMIC STABILITY: FOOD INSECURITY: WITHIN THE PAST 12 MONTHS, YOU WORRIED THAT YOUR FOOD WOULD RUN OUT BEFORE YOU GOT MONEY TO BUY MORE.: NEVER TRUE

## 2025-04-25 ASSESSMENT — PATIENT HEALTH QUESTIONNAIRE - PHQ9
SUM OF ALL RESPONSES TO PHQ QUESTIONS 1-9: 0
2. FEELING DOWN, DEPRESSED OR HOPELESS: NOT AT ALL
SUM OF ALL RESPONSES TO PHQ QUESTIONS 1-9: 0
1. LITTLE INTEREST OR PLEASURE IN DOING THINGS: NOT AT ALL
SUM OF ALL RESPONSES TO PHQ QUESTIONS 1-9: 0
SUM OF ALL RESPONSES TO PHQ QUESTIONS 1-9: 0

## 2025-04-25 NOTE — PROGRESS NOTES
Chief Complaint   Patient presents with    Annual Exam     Have you been to the ER, urgent care clinic since your last visit?  Hospitalized since your last visit?   NO    Have you seen or consulted any other health care providers outside our system since your last visit?   NO      “Have you had a diabetic eye exam?”    NO     No diabetic eye exam on file             
  Transportation Needs: No Transportation Needs (4/25/2025)    PRAPARE - Transportation     Lack of Transportation (Medical): No     Lack of Transportation (Non-Medical): No   Housing Stability: Unknown (4/25/2025)    Housing Stability Vital Sign     Unable to Pay for Housing in the Last Year: No     Homeless in the Last Year: No     Family History   Problem Relation Age of Onset    Cancer Mother        OBJECTIVE:    /66 (BP Site: Left Upper Arm, Patient Position: Sitting)   Pulse 92   Temp 97.5 °F (36.4 °C) (Oral)   Resp 20   Ht 1.803 m (5' 11\")   Wt 113 kg (249 lb 3.2 oz)   SpO2 95%   BMI 34.76 kg/m²   CONSTITUTIONAL: well , well nourished, appears age appropriate  EYES: perrla, eom intact  ENMT:moist mucous membranes, pharynx clear  NECK: supple. Thyroid normal  RESPIRATORY: Chest: clear bilaterally   CARDIOVASCULAR: Heart: regular rate and rhythm  GASTROINTESTINAL: Abdomen: soft, bowel sounds active  HEMATOLOGIC: no pathological lymph nodes palpated  MUSCULOSKELETAL: Extremities: no edema, pulse 1+   INTEGUMENT: No unusual rashes or suspicious skin lesions noted. Nails appear normal.  NEUROLOGIC: non-focal exam   MENTAL STATUS: alert and oriented, appropriate affect             Assessment & Plan  1. Health maintenance: Stable.  - No new medical complaints today.  - Eye exam is scheduled.  - Colonoscopy was performed 3 years ago and is due every 5 years.  - Blood work will be checked in 3 to 4 months and results will be sent.    2. Dyslipidemia: Stable.  - LDL cholesterol was 63, which is at goal.  - On simvastatin 20 mg daily.  - Cholesterol levels are well-managed.    3. Primary hypertension: Stable.  - Blood pressure is at goal.  - Managed with valsartan hydrochlorothiazide.  - No new symptoms or complications reported.    4. Type 2 diabetes mellitus: Uncontrolled.  - Hemoglobin A1c was 9.1.  - On Tresiba 60 units at bedtime and Jardiance 10 mg.  - Dose of Mounjaro increased to 7.5 mg.  -

## 2025-05-16 DIAGNOSIS — Z79.4 TYPE 2 DIABETES MELLITUS WITHOUT COMPLICATION, WITH LONG-TERM CURRENT USE OF INSULIN (HCC): Primary | ICD-10-CM

## 2025-05-16 DIAGNOSIS — E11.9 TYPE 2 DIABETES MELLITUS WITHOUT COMPLICATION, WITH LONG-TERM CURRENT USE OF INSULIN (HCC): Primary | ICD-10-CM

## 2025-06-12 RX ORDER — SIMVASTATIN 20 MG
20 TABLET ORAL DAILY
Qty: 90 TABLET | Refills: 3 | Status: SHIPPED | OUTPATIENT
Start: 2025-06-12

## (undated) DEVICE — FORCEPS BX L240CM JAW DIA2.8MM L CAP W/ NDL MIC MESH TOOTH

## (undated) DEVICE — KIT,1200CC CANISTER,3/16"X6' TUBING: Brand: MEDLINE INDUSTRIES, INC.

## (undated) DEVICE — SYRINGE 50ML E/T

## (undated) DEVICE — CANNULA CUSH AD W/ 14FT TBG

## (undated) DEVICE — BAG SPEC BIOHZRD 10 X 10 IN --

## (undated) DEVICE — ELECTRODE,RADIOTRANSLUCENT,FOAM,5PK: Brand: MEDLINE

## (undated) DEVICE — CONTAINER SPEC 20 ML LID NEUT BUFF FORMALIN 10 % POLYPR STS

## (undated) DEVICE — SOLIDIFIER FLD 2OZ 1500CC N DISINF IN BTL DISP SAFESORB

## (undated) DEVICE — TOWEL 4 PLY TISS 19X30 SUE WHT